# Patient Record
Sex: FEMALE | Race: WHITE | Employment: FULL TIME | URBAN - METROPOLITAN AREA
[De-identification: names, ages, dates, MRNs, and addresses within clinical notes are randomized per-mention and may not be internally consistent; named-entity substitution may affect disease eponyms.]

---

## 2017-01-16 ENCOUNTER — OFFICE VISIT (OUTPATIENT)
Dept: FAMILY MEDICINE CLINIC | Facility: CLINIC | Age: 32
End: 2017-01-16

## 2017-01-16 VITALS
WEIGHT: 168 LBS | TEMPERATURE: 100 F | DIASTOLIC BLOOD PRESSURE: 62 MMHG | BODY MASS INDEX: 26.68 KG/M2 | OXYGEN SATURATION: 98 % | HEART RATE: 88 BPM | HEIGHT: 66.5 IN | SYSTOLIC BLOOD PRESSURE: 112 MMHG

## 2017-01-16 DIAGNOSIS — J06.9 VIRAL URI: ICD-10-CM

## 2017-01-16 DIAGNOSIS — J11.1 FLU SYNDROME: Primary | ICD-10-CM

## 2017-01-16 PROCEDURE — 99213 OFFICE O/P EST LOW 20 MIN: CPT | Performed by: FAMILY MEDICINE

## 2017-01-16 NOTE — PROGRESS NOTES
Manohar Morgan is a 36year old female. Patient presents with:  Cough: per pt   Fatigue  Sore Throat    HPI:   Kiki Farley presents to the office with complaints of upper respiratory tract infection, having congestion for 3 days.   She has had a cough and n disorder   • Other[other] [OTHER] Maternal Grandmother      thyroid disorder   • Cancer Maternal Grandmother 76     breast-treated   • Cancer Maternal Grandfather      skin   • other[other] [OTHER] Father      diverticulitis   • Psychiatric Paternal Grandm indicates understanding of these issues and agrees to the plan. The patient is asked to return if symptoms persist or worsen.       [id number]

## 2017-03-16 ENCOUNTER — TELEPHONE (OUTPATIENT)
Dept: FAMILY MEDICINE CLINIC | Facility: CLINIC | Age: 32
End: 2017-03-16
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
## 2017-03-21 ENCOUNTER — MED REC SCAN ONLY (OUTPATIENT)
Dept: FAMILY MEDICINE CLINIC | Facility: CLINIC | Age: 32
End: 2017-03-21

## 2017-03-24 NOTE — PATIENT INSTRUCTIONS
Viky Chowdhury [phone]  [facility] [phone]  [id number] Yuridia [phone]

## 2017-03-24 NOTE — PROGRESS NOTES
Porter Moncada is a 36year old female. HPI:   Pt is here for ER f/u.     ER: [facility]  Date: 3/19/17  Reason for ER visit: panic attacks and nausea (worsened after starting citalopram, prescribed by my partner when I was out of the office)  Records Take  by mouth.  Disp:  Rfl:         HISTORY:  Past Medical History   Diagnosis Date   • Radicular pain of lower extremity    • Dislocated hip ([address]    • Herniated lumbar disc without myelopathy      pt had mri done 11-15-12   • HSV-1 infection           Pa the ativan and encouraged f/u with counselor; we'll call in a week to get an update; wished her well on her move out of state (recently decided with her boyfriend to move [place])         The patient indicates understanding of these issues and agrees to the

## 2017-03-30 ENCOUNTER — TELEPHONE (OUTPATIENT)
Dept: FAMILY MEDICINE CLINIC | Facility: CLINIC | Age: 32
End: 2017-03-30

## 2017-03-30 NOTE — TELEPHONE ENCOUNTER
Spoke to pt, she states since seeing Dr. Tierney Mantle she has not taken any Ativan. She feels she has had some anxiety but has been able to work through it. With her work schedule being so busy she has not had time to call to schedule with a counselor.      Forward

## 2017-03-30 NOTE — TELEPHONE ENCOUNTER
----- Message from Cari Blanton MD sent at 3/24/2017 11:06 AM CDT -----  Regarding: med f/u  I saw pt one week ago for f/u on her anxiety; we were going to try decreasing the ativan from twice a day to just once a day, taken 30-60min before leaving work (

## 2017-03-31 NOTE — TELEPHONE ENCOUNTER
Spoke with the pt and advised that Dr. Jenna Upton is glad that she is doing better and if she needs anything to please let us know- she v/u

## 2017-03-31 NOTE — TELEPHONE ENCOUNTER
I'm so glad to hear that she's doing a bit better. Please let me know if I can be of any further assistance.

## 2017-04-06 ENCOUNTER — OFFICE VISIT (OUTPATIENT)
Dept: FAMILY MEDICINE CLINIC | Facility: CLINIC | Age: 32
End: 2017-04-06

## 2017-04-06 VITALS
HEIGHT: 66 IN | TEMPERATURE: 98 F | RESPIRATION RATE: 16 BRPM | BODY MASS INDEX: 24.11 KG/M2 | DIASTOLIC BLOOD PRESSURE: 70 MMHG | HEART RATE: 64 BPM | SYSTOLIC BLOOD PRESSURE: 110 MMHG | WEIGHT: 150 LBS

## 2017-04-06 DIAGNOSIS — R76.8 THYROID ANTIBODY POSITIVE: ICD-10-CM

## 2017-04-06 DIAGNOSIS — Z12.4 CERVICAL CANCER SCREENING: ICD-10-CM

## 2017-04-06 DIAGNOSIS — Z83.49 FAMILY HISTORY OF THYROID DISEASE: ICD-10-CM

## 2017-04-06 DIAGNOSIS — Z12.39 SCREENING BREAST EXAMINATION: ICD-10-CM

## 2017-04-06 DIAGNOSIS — Z00.00 ROUTINE HISTORY AND PHYSICAL EXAMINATION OF ADULT: Primary | ICD-10-CM

## 2017-04-06 PROCEDURE — 88175 CYTOPATH C/V AUTO FLUID REDO: CPT | Performed by: FAMILY MEDICINE

## 2017-04-06 PROCEDURE — 99395 PREV VISIT EST AGE 18-39: CPT | Performed by: FAMILY MEDICINE

## 2017-04-06 NOTE — PROGRESS NOTES
HPI:   Corine Bruno is a 36year old female who presents for a complete physical exam.  Patient complains of nothing new, feeling well since our last visit, has needed the anxiety medication once. Things are definitely calming down and more stable. infection           Past Surgical History    EXCIS BARTHOLIN GLAND/CYST      Comment 2006 -2008     CHOLECYSTECTOMY  2012    LEEP      Comment years ago--PAPs normalized after      Family History   Problem Relation Age of Onset   • Other[other] [OTHER] Mot 66\"  Wt 150 lb  BMI 24.22 kg/m2  LMP 03/13/2017 (Exact Date)  Body mass index is 24.22 kg/(m^2).    GENERAL: well developed, well nourished,in no apparent distress  SKIN: no rashes,no suspicious lesions  HEENT: atraumatic, normocephalic,ears and throat are Visit:  No prescriptions requested or ordered in this encounter    Imaging & Consults:  None

## 2017-04-11 ENCOUNTER — TELEPHONE (OUTPATIENT)
Dept: FAMILY MEDICINE CLINIC | Facility: CLINIC | Age: 32
End: 2017-04-11

## 2017-04-11 NOTE — TELEPHONE ENCOUNTER
Please send patient a [employer] message in regards to prepping for procedure/appt  Future Appointments  Date Time Provider Edenilson Judge   5/1/2017 8:20 AM Savage Roblero MD [facility] SAL [place]

## 2017-05-01 ENCOUNTER — OFFICE VISIT (OUTPATIENT)
Dept: FAMILY MEDICINE CLINIC | Facility: CLINIC | Age: 32
End: 2017-05-01

## 2017-05-01 VITALS
HEART RATE: 88 BPM | TEMPERATURE: 98 F | SYSTOLIC BLOOD PRESSURE: 122 MMHG | BODY MASS INDEX: 24 KG/M2 | WEIGHT: 148 LBS | DIASTOLIC BLOOD PRESSURE: 76 MMHG

## 2017-05-01 DIAGNOSIS — R87.612 PAPANICOLAOU SMEAR OF CERVIX WITH LOW GRADE SQUAMOUS INTRAEPITHELIAL LESION (LGSIL): Primary | ICD-10-CM

## 2017-05-01 DIAGNOSIS — Z01.812 PRE-PROCEDURE LAB EXAM: ICD-10-CM

## 2017-05-01 PROCEDURE — 57456 ENDOCERV CURETTAGE W/SCOPE: CPT | Performed by: FAMILY MEDICINE

## 2017-05-01 PROCEDURE — 88305 TISSUE EXAM BY PATHOLOGIST: CPT | Performed by: FAMILY MEDICINE

## 2017-05-01 NOTE — PROGRESS NOTES
Cocnha Faria is a 36year old female. HPI:   Pt is here for a colposcopy.      Indication: LGSIL PAP  Prior colposcopy: yes (age 25 had LEEP)  Pregnancy test: neg  Iodine allergy: no  Vinegar allergy: no    Pt has no concerns prior to this colposcopy 88  Temp(Src) 98 °F (36.7 °C) (Temporal)  Wt 148 lb  LMP 04/10/2017 (Exact Date)  GENERAL: well developed, well nourished,in no apparent distress  : see scanned colpo form; perineum normal; ECC performed; cervical biopsy not performed; monsel's solution

## 2017-05-05 ENCOUNTER — TELEPHONE (OUTPATIENT)
Dept: FAMILY MEDICINE CLINIC | Facility: CLINIC | Age: 32
End: 2017-05-05

## 2017-05-05 NOTE — TELEPHONE ENCOUNTER
----- Message from Aaron Bradley MD sent at 5/5/2017  7:56 AM CDT -----  Please notify pt of good news, her cervical scrapings were negative for abnormality. We can recheck her PAP in 1 year. Please let me know if any questions.

## 2017-06-05 ENCOUNTER — TELEPHONE (OUTPATIENT)
Dept: FAMILY MEDICINE CLINIC | Facility: CLINIC | Age: 32
End: 2017-06-05

## 2017-06-05 NOTE — TELEPHONE ENCOUNTER
Called the pt and advised of the instructions from Dr. Rl Barr and we tentatively scheduled for Wed  Future Appointments  Date Time Provider Edenilson Judge   6/7/2017 2:00 PM Nnamdi Parsons MD [facility] SAL [place]

## 2017-06-30 RX ORDER — ETONOGESTREL/ETHINYL ESTRADIOL .12-.015MG
RING, VAGINAL VAGINAL
Qty: 1 EACH | Refills: 0 | Status: SHIPPED | OUTPATIENT
Start: 2017-06-30 | End: 2017-07-28

## 2017-07-28 RX ORDER — ETONOGESTREL AND ETHINYL ESTRADIOL 11.7; 2.7 MG/1; MG/1
INSERT, EXTENDED RELEASE VAGINAL
Qty: 3 EACH | Refills: 1 | Status: SHIPPED | OUTPATIENT
Start: 2017-07-28 | End: 2018-01-29

## 2018-01-05 ENCOUNTER — OFFICE VISIT (OUTPATIENT)
Dept: FAMILY MEDICINE CLINIC | Facility: CLINIC | Age: 33
End: 2018-01-05

## 2018-01-05 VITALS
BODY MASS INDEX: 25 KG/M2 | RESPIRATION RATE: 16 BRPM | WEIGHT: 157 LBS | SYSTOLIC BLOOD PRESSURE: 104 MMHG | TEMPERATURE: 100 F | DIASTOLIC BLOOD PRESSURE: 60 MMHG | HEART RATE: 72 BPM

## 2018-01-05 DIAGNOSIS — L70.0 CYSTIC ACNE: Primary | ICD-10-CM

## 2018-01-05 PROCEDURE — 82627 DEHYDROEPIANDROSTERONE: CPT | Performed by: FAMILY MEDICINE

## 2018-01-05 PROCEDURE — 84403 ASSAY OF TOTAL TESTOSTERONE: CPT | Performed by: FAMILY MEDICINE

## 2018-01-05 PROCEDURE — 80050 GENERAL HEALTH PANEL: CPT | Performed by: FAMILY MEDICINE

## 2018-01-05 PROCEDURE — 99213 OFFICE O/P EST LOW 20 MIN: CPT | Performed by: FAMILY MEDICINE

## 2018-01-05 PROCEDURE — 84402 ASSAY OF FREE TESTOSTERONE: CPT | Performed by: FAMILY MEDICINE

## 2018-01-05 PROCEDURE — 84439 ASSAY OF FREE THYROXINE: CPT | Performed by: FAMILY MEDICINE

## 2018-01-05 PROCEDURE — 36415 COLL VENOUS BLD VENIPUNCTURE: CPT | Performed by: FAMILY MEDICINE

## 2018-01-05 RX ORDER — GREEN TEA/HOODIA GORDONII 315-12.5MG
1 CAPSULE ORAL
Refills: 0 | COMMUNITY
Start: 2018-01-05 | End: 2018-06-13

## 2018-01-05 NOTE — PROGRESS NOTES
Vielka Jett is a 29year old female. HPI:   Pt is concerned about possible cystic acne on her face, usually on chin, near nose, forehead, sometimes closer to hairline.   Has been a problem 4-5 months now, coming 1 at a time typically every week or thyroid disorder   • Other[other] [OTHER] Maternal Grandmother      thyroid disorder   • Cancer Maternal Grandmother 76     breast-treated   • Cancer Maternal Grandfather      skin   • other[other] [OTHER] Father      diverticulitis   • Psychiatric Milton Persaud

## 2018-01-06 LAB
ALBUMIN SERPL-MCNC: 3.6 G/DL (ref 3.5–4.8)
ALP LIVER SERPL-CCNC: 64 U/L (ref 37–98)
ALT SERPL-CCNC: 21 U/L (ref 14–54)
AST SERPL-CCNC: 16 U/L (ref 15–41)
BASOPHILS # BLD AUTO: 0.05 X10(3) UL (ref 0–0.1)
BASOPHILS NFR BLD AUTO: 0.9 %
BILIRUB SERPL-MCNC: 0.2 MG/DL (ref 0.1–2)
BUN BLD-MCNC: 11 MG/DL (ref 8–20)
CALCIUM BLD-MCNC: 8.7 MG/DL (ref 8.3–10.3)
CHLORIDE: 105 MMOL/L (ref 101–111)
CO2: 27 MMOL/L (ref 22–32)
CREAT BLD-MCNC: 0.62 MG/DL (ref 0.55–1.02)
EOSINOPHIL # BLD AUTO: 0.09 X10(3) UL (ref 0–0.3)
EOSINOPHIL NFR BLD AUTO: 1.6 %
ERYTHROCYTE [DISTWIDTH] IN BLOOD BY AUTOMATED COUNT: 12.8 % (ref 11.5–16)
FREE T4: 0.9 NG/DL (ref 0.9–1.8)
GLUCOSE BLD-MCNC: 92 MG/DL (ref 70–99)
HCT VFR BLD AUTO: 37.6 % (ref 34–50)
HGB BLD-MCNC: 12.3 G/DL (ref 12–16)
IMMATURE GRANULOCYTE COUNT: 0.03 X10(3) UL (ref 0–1)
IMMATURE GRANULOCYTE RATIO %: 0.5 %
LYMPHOCYTES # BLD AUTO: 2.16 X10(3) UL (ref 0.9–4)
LYMPHOCYTES NFR BLD AUTO: 38.5 %
M PROTEIN MFR SERPL ELPH: 7.2 G/DL (ref 6.1–8.3)
MCH RBC QN AUTO: 28 PG (ref 27–33.2)
MCHC RBC AUTO-ENTMCNC: 32.7 G/DL (ref 31–37)
MCV RBC AUTO: 85.5 FL (ref 81–100)
MONOCYTES # BLD AUTO: 0.42 X10(3) UL (ref 0.1–0.6)
MONOCYTES NFR BLD AUTO: 7.5 %
NEUTROPHIL ABS PRELIM: 2.86 X10 (3) UL (ref 1.3–6.7)
NEUTROPHILS # BLD AUTO: 2.86 X10(3) UL (ref 1.3–6.7)
NEUTROPHILS NFR BLD AUTO: 51 %
PLATELET # BLD AUTO: 206 10(3)UL (ref 150–450)
POTASSIUM SERPL-SCNC: 3.8 MMOL/L (ref 3.6–5.1)
RBC # BLD AUTO: 4.4 X10(6)UL (ref 3.8–5.1)
RED CELL DISTRIBUTION WIDTH-SD: 39.8 FL (ref 35.1–46.3)
SODIUM SERPL-SCNC: 141 MMOL/L (ref 136–144)
TSI SER-ACNC: 1.19 MIU/ML (ref 0.35–5.5)
WBC # BLD AUTO: 5.6 X10(3) UL (ref 4–13)

## 2018-01-07 LAB — DHEA SULFATE, SERUM: 253 UG/DL

## 2018-01-08 LAB
SEX HORMONE BINDING GLOBULIN: 208 NMOL/L
TESTOSTERONE -MS, BIOAVAILAB: 5.8 NG/DL
TESTOSTERONE, -MS/MS: 51 NG/DL
TESTOSTERONE, FREE -MS/MS: 2.2 PG/ML

## 2018-01-29 RX ORDER — ETONOGESTREL AND ETHINYL ESTRADIOL 11.7; 2.7 MG/1; MG/1
INSERT, EXTENDED RELEASE VAGINAL
Qty: 3 EACH | Refills: 1 | Status: SHIPPED | OUTPATIENT
Start: 2018-01-29 | End: 2019-03-04

## 2018-01-29 NOTE — TELEPHONE ENCOUNTER
LOV  01/05/2018  Last refill  NuvaRing  07/28/2017 #3 each w. 1 RF  Future Appointments  Date Time Provider Edenilson Judge   4/13/2018 9:20 AM Philip Slade MD [facility] [place]     Please advise.

## 2018-05-07 RX ORDER — LORAZEPAM 1 MG/1
TABLET ORAL
Qty: 15 TABLET | Refills: 0 | Status: SHIPPED
Start: 2018-05-07 | End: 2018-06-13 | Stop reason: DRUGHIGH

## 2018-05-07 NOTE — TELEPHONE ENCOUNTER
Last refilled on 3/24/17 for # 30 with 0 refills  Last seen on 1/5/18  Future Appointments  Date Time Provider Edenilson Judge   6/13/2018 8:45 AM Kanu Contreras MD [facility] SAL [place]        Thank you.

## 2018-06-13 ENCOUNTER — OFFICE VISIT (OUTPATIENT)
Dept: FAMILY MEDICINE CLINIC | Facility: CLINIC | Age: 33
End: 2018-06-13

## 2018-06-13 VITALS
DIASTOLIC BLOOD PRESSURE: 70 MMHG | WEIGHT: 156 LBS | SYSTOLIC BLOOD PRESSURE: 100 MMHG | HEIGHT: 66 IN | TEMPERATURE: 99 F | HEART RATE: 68 BPM | BODY MASS INDEX: 25.07 KG/M2 | RESPIRATION RATE: 16 BRPM

## 2018-06-13 DIAGNOSIS — F41.8 ANXIETY ASSOCIATED WITH DEPRESSION: ICD-10-CM

## 2018-06-13 DIAGNOSIS — Z00.00 ROUTINE HISTORY AND PHYSICAL EXAMINATION OF ADULT: ICD-10-CM

## 2018-06-13 DIAGNOSIS — Z12.4 CERVICAL CANCER SCREENING: Primary | ICD-10-CM

## 2018-06-13 DIAGNOSIS — R21 SKIN RASH: ICD-10-CM

## 2018-06-13 PROCEDURE — 87624 HPV HI-RISK TYP POOLED RSLT: CPT | Performed by: FAMILY MEDICINE

## 2018-06-13 PROCEDURE — 88175 CYTOPATH C/V AUTO FLUID REDO: CPT | Performed by: FAMILY MEDICINE

## 2018-06-13 PROCEDURE — 99395 PREV VISIT EST AGE 18-39: CPT | Performed by: FAMILY MEDICINE

## 2018-06-13 NOTE — PROGRESS NOTES
HPI:   Lluvia Chavarria is a 29year old female who presents for a complete physical exam.  Patient complains of itchy rash on abd coming and going (mostly bra line and waist line) for several days since staying at [facility].  No obv ----------  AST (U/L)   Date Value   01/05/2018 16   02/10/2016 19   06/26/2015 12 (L)   02/15/2013 16   11/20/2012 19   10/31/2012 20   ----------  ALT (U/L)   Date Value   02/15/2013 17   11/20/2012 30   10/31/2012 28   ----------  Alt (U/L)   Date Susan Exercise: yes  Diet: pretty healthy     REVIEW OF SYSTEMS:   GENERAL: feels well in general, no unexpected weight changes, no fevers  SKIN: denies any unusual skin lesions  EYES:denies blurred vision or double vision  HEENT: denies nasal congestion, sinu in a funk including options (cbt/Coaching/normalizaing mood, yoga, meditation, therapy, meds, psych referral); opted to hold off on meds for now, work on the nonmedicinal approaches first  For disease prevention (dementia, cancer, diabetes, heart disease,

## 2018-06-13 NOTE — PATIENT INSTRUCTIONS
Books:     Anything by Mars Prado    The Feeling Good Handbook   By Craig Magdaleno    The Fifth Agreement  By mahesh Interiano    Inspirational/self help  You Are a Bad Ass  By Crescencio Camejo    Haven't ready any yet, but keep getting rave reviews for Emilee Phillips

## 2018-06-24 PROBLEM — F41.8 ANXIETY ASSOCIATED WITH DEPRESSION: Status: ACTIVE | Noted: 2018-06-24

## 2018-09-20 ENCOUNTER — OFFICE VISIT (OUTPATIENT)
Dept: FAMILY MEDICINE CLINIC | Facility: CLINIC | Age: 33
End: 2018-09-20
Payer: COMMERCIAL

## 2018-09-20 VITALS
DIASTOLIC BLOOD PRESSURE: 72 MMHG | SYSTOLIC BLOOD PRESSURE: 122 MMHG | TEMPERATURE: 99 F | WEIGHT: 157 LBS | HEIGHT: 66.25 IN | BODY MASS INDEX: 25.23 KG/M2 | HEART RATE: 72 BPM | RESPIRATION RATE: 14 BRPM

## 2018-09-20 DIAGNOSIS — L98.9 SKIN LESION: Primary | ICD-10-CM

## 2018-09-20 PROCEDURE — 99213 OFFICE O/P EST LOW 20 MIN: CPT | Performed by: FAMILY MEDICINE

## 2018-09-20 RX ORDER — CLINDAMYCIN HYDROCHLORIDE 300 MG/1
300 CAPSULE ORAL 3 TIMES DAILY
Qty: 30 CAPSULE | Refills: 0 | Status: SHIPPED | OUTPATIENT
Start: 2018-09-20 | End: 2018-09-30

## 2018-09-20 RX ORDER — CHOLECALCIFEROL (VITAMIN D3) 50 MCG
TABLET ORAL
COMMUNITY
End: 2019-04-05

## 2018-09-20 NOTE — PROGRESS NOTES
HPI:    Patient ID: Haroon Colin is a 34year old female. Patient presents with:  Bump: per pt, on vaginal area      HPI  Patient is here for a bump in her groin area. On her Rt outer vaginal lip. States she noticed this about 2-3 weeks ago.  It ha date: Dislocated hip ([address]  No date: Herniated lumbar disc without myelopathy      Comment:  pt had mri done 11-15-12  No date: HSV-1 infection  No date: Radicular pain of lower extremity   Past Surgical History:  2012: CHOLECYSTECTOMY  No date: EXCIS PATRICE Mat Chavez MD      The above note was dictated. Any errors in text might be due to dictation.

## 2018-10-17 ENCOUNTER — TELEPHONE (OUTPATIENT)
Dept: FAMILY MEDICINE CLINIC | Facility: CLINIC | Age: 33
End: 2018-10-17

## 2018-10-17 NOTE — TELEPHONE ENCOUNTER
If no fever, not red, not hot and it popped it's likely taking care of itself, can do warm compresses and watch it for the next several days, but if any signs of worsening symptoms/infection jefferson luna

## 2018-10-17 NOTE — TELEPHONE ENCOUNTER
Pt  Called back and nothing has come out and it was almost gone but she felt a hard ball of something there and she completed the abx.   She thought that it was an ingrown hair and she could still feel it there, but it was not getting bigger the over the la

## 2018-10-17 NOTE — TELEPHONE ENCOUNTER
Spoke with the pt and advised of the instructions for the abscess and the pt states that she has another question.     She tells me that her work is trying to make her get a flu shot and she is wanting to know if she can get a note from Dr. Irene Charles to exempt h

## 2018-10-17 NOTE — TELEPHONE ENCOUNTER
Let message for the pt to call back and that I need to know if the abcess got better and then got worse again, it there is any fluid coming out of it

## 2018-10-17 NOTE — TELEPHONE ENCOUNTER
Pt called, was seen approx 3 weeks ago by another dr in our practice for a bump in groin area, prescribed medication, helped a little bit but pt still has the bump. Pt would like to discuss and possible by seen by Dr. Rl Barr herself.   Please call pt at [phone]-

## 2018-10-18 NOTE — TELEPHONE ENCOUNTER
Left detailed message advising Dr Artur Harris note. Ok per [employer] form. Advised to call office with any questions.

## 2019-01-09 ENCOUNTER — OFFICE VISIT (OUTPATIENT)
Dept: FAMILY MEDICINE CLINIC | Facility: CLINIC | Age: 34
End: 2019-01-09
Payer: COMMERCIAL

## 2019-01-09 VITALS
SYSTOLIC BLOOD PRESSURE: 110 MMHG | WEIGHT: 157 LBS | TEMPERATURE: 99 F | DIASTOLIC BLOOD PRESSURE: 70 MMHG | BODY MASS INDEX: 25.23 KG/M2 | HEIGHT: 66.25 IN | RESPIRATION RATE: 12 BRPM | HEART RATE: 70 BPM

## 2019-01-09 DIAGNOSIS — H92.01 RIGHT EAR PAIN: Primary | ICD-10-CM

## 2019-01-09 DIAGNOSIS — H73.891 RETRACTION OF TYMPANIC MEMBRANE OF RIGHT EAR: ICD-10-CM

## 2019-01-09 DIAGNOSIS — F43.22 ADJUSTMENT DISORDER WITH ANXIETY: ICD-10-CM

## 2019-01-09 PROCEDURE — 99213 OFFICE O/P EST LOW 20 MIN: CPT | Performed by: FAMILY MEDICINE

## 2019-01-09 RX ORDER — LORAZEPAM 0.5 MG/1
TABLET ORAL
Qty: 30 TABLET | Refills: 0 | Status: SHIPPED
Start: 2019-01-09 | End: 2019-03-01

## 2019-01-09 RX ORDER — PREDNISONE 20 MG/1
TABLET ORAL
Qty: 11 TABLET | Refills: 0 | Status: SHIPPED | OUTPATIENT
Start: 2019-01-09 | End: 2019-04-05 | Stop reason: ALTCHOICE

## 2019-01-10 NOTE — PROGRESS NOTES
HPI:   Pee Zambrano is a 34year old female who presents for upper respiratory symptoms for at least 14 days. Started with: had a rough URI after thanksgiving, was seen in UC had neg flu/strep/mono testing and the worst of that resolved.     Now ha Tobacco Use      Smoking status: Former Smoker        Packs/day: 0.10        Years: 8.00        Pack years: .8        Types: Cigarettes      Smokeless tobacco: Never Used    Alcohol use:  Yes      Alcohol/week: 0.0 oz      Comment: occasionally     Drug us

## 2019-03-01 NOTE — TELEPHONE ENCOUNTER
Lorazepam last refilled on 1/9/19 for # 30 with 0 refills  nuvaring refilled 1/29/18 #3 with 1 refill  Last OV 1/9/19  Future Appointments   Date Time Provider Edenilson Judge   4/5/2019  9:00 AM Mk Núñez MD [facility] SAL [place]        Thank you.

## 2019-03-01 NOTE — TELEPHONE ENCOUNTER
Etonogestrel-Ethinyl Estradiol (NUVARING) 0.12-0.015 MG/24HR Vaginal Ring    LORazepam (ATIVAN) 0.5 MG Oral Tab  [facility]/PHARMACY #[id number]- [place], [place] - [address] [place] [address], [phone]

## 2019-03-04 RX ORDER — LORAZEPAM 0.5 MG/1
TABLET ORAL
Qty: 30 TABLET | Refills: 0 | Status: SHIPPED
Start: 2019-03-04 | End: 2019-09-23

## 2019-03-04 RX ORDER — ETONOGESTREL AND ETHINYL ESTRADIOL 11.7; 2.7 MG/1; MG/1
INSERT, EXTENDED RELEASE VAGINAL
Qty: 3 EACH | Refills: 1 | Status: SHIPPED | OUTPATIENT
Start: 2019-03-04 | End: 2019-07-26

## 2019-04-05 ENCOUNTER — OFFICE VISIT (OUTPATIENT)
Dept: FAMILY MEDICINE CLINIC | Facility: CLINIC | Age: 34
End: 2019-04-05
Payer: COMMERCIAL

## 2019-04-05 VITALS
BODY MASS INDEX: 26.03 KG/M2 | WEIGHT: 162 LBS | SYSTOLIC BLOOD PRESSURE: 120 MMHG | HEART RATE: 77 BPM | TEMPERATURE: 98 F | RESPIRATION RATE: 14 BRPM | HEIGHT: 66.25 IN | DIASTOLIC BLOOD PRESSURE: 76 MMHG | OXYGEN SATURATION: 98 %

## 2019-04-05 DIAGNOSIS — F41.8 ANXIETY ASSOCIATED WITH DEPRESSION: ICD-10-CM

## 2019-04-05 DIAGNOSIS — Z13.1 DIABETES MELLITUS SCREENING: ICD-10-CM

## 2019-04-05 DIAGNOSIS — Z12.4 CERVICAL CANCER SCREENING: ICD-10-CM

## 2019-04-05 DIAGNOSIS — E55.9 VITAMIN D DEFICIENCY: ICD-10-CM

## 2019-04-05 DIAGNOSIS — R76.8 THYROID ANTIBODY POSITIVE: ICD-10-CM

## 2019-04-05 DIAGNOSIS — Z00.00 ROUTINE HISTORY AND PHYSICAL EXAMINATION OF ADULT: Primary | ICD-10-CM

## 2019-04-05 DIAGNOSIS — B00.9 HSV-1 INFECTION: ICD-10-CM

## 2019-04-05 DIAGNOSIS — Z83.49 FAMILY HISTORY OF THYROID DISEASE: ICD-10-CM

## 2019-04-05 DIAGNOSIS — Z13.0 SCREENING, ANEMIA, DEFICIENCY, IRON: ICD-10-CM

## 2019-04-05 DIAGNOSIS — Z13.29 THYROID DISORDER SCREEN: ICD-10-CM

## 2019-04-05 DIAGNOSIS — Z13.220 LIPID SCREENING: ICD-10-CM

## 2019-04-05 DIAGNOSIS — Z12.39 SCREENING BREAST EXAMINATION: ICD-10-CM

## 2019-04-05 DIAGNOSIS — M54.16 LUMBAR RADICULOPATHY: ICD-10-CM

## 2019-04-05 PROCEDURE — 88175 CYTOPATH C/V AUTO FLUID REDO: CPT | Performed by: FAMILY MEDICINE

## 2019-04-05 PROCEDURE — 85025 COMPLETE CBC W/AUTO DIFF WBC: CPT | Performed by: FAMILY MEDICINE

## 2019-04-05 PROCEDURE — 86800 THYROGLOBULIN ANTIBODY: CPT | Performed by: FAMILY MEDICINE

## 2019-04-05 PROCEDURE — 87624 HPV HI-RISK TYP POOLED RSLT: CPT | Performed by: FAMILY MEDICINE

## 2019-04-05 PROCEDURE — 82306 VITAMIN D 25 HYDROXY: CPT | Performed by: FAMILY MEDICINE

## 2019-04-05 PROCEDURE — 86376 MICROSOMAL ANTIBODY EACH: CPT | Performed by: FAMILY MEDICINE

## 2019-04-05 PROCEDURE — 84443 ASSAY THYROID STIM HORMONE: CPT | Performed by: FAMILY MEDICINE

## 2019-04-05 PROCEDURE — 80053 COMPREHEN METABOLIC PANEL: CPT | Performed by: FAMILY MEDICINE

## 2019-04-05 PROCEDURE — 84439 ASSAY OF FREE THYROXINE: CPT | Performed by: FAMILY MEDICINE

## 2019-04-05 PROCEDURE — 99395 PREV VISIT EST AGE 18-39: CPT | Performed by: FAMILY MEDICINE

## 2019-04-05 PROCEDURE — 36415 COLL VENOUS BLD VENIPUNCTURE: CPT | Performed by: FAMILY MEDICINE

## 2019-04-05 PROCEDURE — 80061 LIPID PANEL: CPT | Performed by: FAMILY MEDICINE

## 2019-04-05 PROCEDURE — 84480 ASSAY TRIIODOTHYRONINE (T3): CPT | Performed by: FAMILY MEDICINE

## 2019-04-05 RX ORDER — ETONOGESTREL AND ETHINYL ESTRADIOL 11.7; 2.7 MG/1; MG/1
1 INSERT, EXTENDED RELEASE VAGINAL
COMMUNITY
End: 2019-07-28

## 2019-04-05 NOTE — PROGRESS NOTES
HPI:   Froylan Guerra is a 34year old female who presents for a complete physical exam.      Patient complains of bi ear fullness still since jan, never got better and now feeling some fullness in her face and headaches this week, pressure bheind eyeb Date   • Dislocated hip ([facility] [address]    • Herniated lumbar disc without myelopathy     pt had mri done 11-15-12   • HSV-1 infection    • Radicular pain of lower extremity       Past Surgical History:   Procedure Laterality Date   • CHOLECYSTECTOMY  2012   • EXCIS 03/13/2019   SpO2 98%   BMI 25.95 kg/m²   Body mass index is 25.95 kg/m².    GENERAL: well developed, well nourished,in no apparent distress  SKIN: no rashes,no suspicious lesions  HEENT: atraumatic, normocephalic,ears and throat are clear  EYES:CAITILN JORGENSEN PEROXIDASE AND THYROGLOBULIN ANTIBODIES  - TRIIODOTHYRONINE (T3) TOTAL  - FREE T4 (FREE THYROXINE)  - CBC W/ DIFFERENTIAL    2. Screening breast examination  Normal exam    3.  Cervical cancer screening  If normal can repeat in 2 years  - THINPREP PAP SMEAR VITAMIN D, 25-HYDROXY    11. Lumbar radiculopathy  Well controlled, no further eval/tx desired at this time    12. HSV-1 infection  Rare symtpoms (asymptomatic now)    13.  Anxiety associated with depression  Doing quite well with non medicinal strategies,

## 2019-04-10 ENCOUNTER — TELEPHONE (OUTPATIENT)
Dept: FAMILY MEDICINE CLINIC | Facility: CLINIC | Age: 34
End: 2019-04-10

## 2019-04-10 DIAGNOSIS — H93.8X3 EAR FULLNESS, BILATERAL: Primary | ICD-10-CM

## 2019-04-10 NOTE — TELEPHONE ENCOUNTER
Dr. Gaby Vergara, ENT , Ji Jackson is requesting referral from [facility] for pt to be seen.  Pls fax to [address] [place] [address] West: [phone]

## 2019-05-01 ENCOUNTER — MED REC SCAN ONLY (OUTPATIENT)
Dept: FAMILY MEDICINE CLINIC | Facility: CLINIC | Age: 34
End: 2019-05-01

## 2019-05-03 ENCOUNTER — E-VISIT (OUTPATIENT)
Dept: FAMILY MEDICINE CLINIC | Facility: CLINIC | Age: 34
End: 2019-05-03

## 2019-05-03 DIAGNOSIS — R30.0 DYSURIA: Primary | ICD-10-CM

## 2019-05-07 ENCOUNTER — TELEPHONE (OUTPATIENT)
Dept: FAMILY MEDICINE CLINIC | Facility: CLINIC | Age: 34
End: 2019-05-07

## 2019-05-07 NOTE — TELEPHONE ENCOUNTER
Patient found an ENT through her insurance for some issues she has been having. They ordered a MRI for her which she did, and she also requested that a copy be sent to Dr. Umu Faith.  Patient has a f/u appointment with a hearing test scheduled with the ENT tomor

## 2019-07-28 RX ORDER — ETONOGESTREL AND ETHINYL ESTRADIOL 11.7; 2.7 MG/1; MG/1
INSERT, EXTENDED RELEASE VAGINAL
Qty: 3 EACH | Refills: 3 | Status: SHIPPED | OUTPATIENT
Start: 2019-07-28 | End: 2020-06-24 | Stop reason: ALTCHOICE

## 2019-07-30 RX ORDER — ETONOGESTREL AND ETHINYL ESTRADIOL 11.7; 2.7 MG/1; MG/1
INSERT, EXTENDED RELEASE VAGINAL
Qty: 3 EACH | Refills: 3 | Status: SHIPPED | OUTPATIENT
Start: 2019-07-30 | End: 2020-08-25

## 2019-09-24 RX ORDER — LORAZEPAM 0.5 MG/1
TABLET ORAL
Qty: 30 TABLET | Refills: 0 | Status: SHIPPED | OUTPATIENT
Start: 2019-09-24 | End: 2020-03-09

## 2019-09-30 ENCOUNTER — E-VISIT (OUTPATIENT)
Dept: FAMILY MEDICINE CLINIC | Facility: CLINIC | Age: 34
End: 2019-09-30

## 2019-09-30 DIAGNOSIS — J01.90 ACUTE SINUSITIS, RECURRENCE NOT SPECIFIED, UNSPECIFIED LOCATION: Primary | ICD-10-CM

## 2019-09-30 PROCEDURE — 98969 ONLINE SERVICE BY HC PRO: CPT | Performed by: NURSE PRACTITIONER

## 2019-09-30 RX ORDER — AMOXICILLIN AND CLAVULANATE POTASSIUM 875; 125 MG/1; MG/1
1 TABLET, FILM COATED ORAL 2 TIMES DAILY
Qty: 20 TABLET | Refills: 0 | Status: SHIPPED | OUTPATIENT
Start: 2019-09-30 | End: 2019-10-10

## 2019-09-30 RX ORDER — FLUTICASONE PROPIONATE 50 MCG
2 SPRAY, SUSPENSION (ML) NASAL DAILY
Qty: 1 INHALER | Refills: 0 | Status: SHIPPED | OUTPATIENT
Start: 2019-09-30 | End: 2020-05-01 | Stop reason: ALTCHOICE

## 2019-10-04 ENCOUNTER — TELEPHONE (OUTPATIENT)
Dept: FAMILY MEDICINE CLINIC | Facility: CLINIC | Age: 34
End: 2019-10-04

## 2019-10-04 NOTE — TELEPHONE ENCOUNTER
Pt is calling to let us know now she is having a lot of sinus pressure and sore throat. She was emailing back and forth with [address] and did an Evisit 9-30-19. She has been taking OTC sinus pressure meds, Ibuprofen, ABX and sinus spray.    She is looking for rebel

## 2019-10-07 ENCOUNTER — TELEPHONE (OUTPATIENT)
Dept: FAMILY MEDICINE CLINIC | Facility: CLINIC | Age: 34
End: 2019-10-07

## 2019-10-07 DIAGNOSIS — B37.3 VAGINAL CANDIDA: Primary | ICD-10-CM

## 2019-10-07 RX ORDER — FLUCONAZOLE 150 MG/1
TABLET ORAL
Qty: 2 TABLET | Refills: 0 | Status: SHIPPED | OUTPATIENT
Start: 2019-10-07 | End: 2020-05-01 | Stop reason: ALTCHOICE

## 2019-10-07 NOTE — TELEPHONE ENCOUNTER
PT is going to see Dr. Mateo Soriano on Wednesday for the sinus issue. Forward to Dr. Humera Peña to send in medication for yeast infection. Thanks.     Future Appointments   Date Time Provider Edenilson Judge   10/9/2019  3:30 PM Amanda Dent DO [facility]

## 2019-10-07 NOTE — TELEPHONE ENCOUNTER
Pt was put on an antibiotic (which isn't helping) and she now has a yeast infection.  Please call back

## 2019-10-07 NOTE — TELEPHONE ENCOUNTER
I can send a script for the yeast infection, but for the respiratory issues, I would like her to be seen since she hasn't actually been seen in person for this. Let me know if she would like th diflucan script.

## 2019-10-07 NOTE — TELEPHONE ENCOUNTER
Forward to Dr. Darío Cartagena, please advise, there are messages via [employer]. Does she need to be seen?

## 2019-10-09 ENCOUNTER — OFFICE VISIT (OUTPATIENT)
Dept: FAMILY MEDICINE CLINIC | Facility: CLINIC | Age: 34
End: 2019-10-09
Payer: COMMERCIAL

## 2019-10-09 VITALS
WEIGHT: 143.81 LBS | HEART RATE: 86 BPM | HEIGHT: 66.25 IN | DIASTOLIC BLOOD PRESSURE: 80 MMHG | RESPIRATION RATE: 10 BRPM | TEMPERATURE: 100 F | BODY MASS INDEX: 23.11 KG/M2 | SYSTOLIC BLOOD PRESSURE: 110 MMHG

## 2019-10-09 DIAGNOSIS — R19.8 CLENCHING OF TEETH: ICD-10-CM

## 2019-10-09 DIAGNOSIS — R09.81 SINUS CONGESTION: Primary | ICD-10-CM

## 2019-10-09 DIAGNOSIS — H92.02 LEFT EAR PAIN: ICD-10-CM

## 2019-10-09 DIAGNOSIS — G44.209 TENSION HEADACHE: ICD-10-CM

## 2019-10-09 DIAGNOSIS — R51.9 HEADACHE BEHIND THE EYES: ICD-10-CM

## 2019-10-09 DIAGNOSIS — M26.622 ARTHRALGIA OF LEFT TEMPOROMANDIBULAR JOINT: ICD-10-CM

## 2019-10-09 PROCEDURE — 99214 OFFICE O/P EST MOD 30 MIN: CPT | Performed by: FAMILY MEDICINE

## 2019-10-09 RX ORDER — CYCLOBENZAPRINE HCL 10 MG
10 TABLET ORAL NIGHTLY
Qty: 10 TABLET | Refills: 0 | Status: SHIPPED | OUTPATIENT
Start: 2019-10-09 | End: 2020-08-25

## 2019-10-09 NOTE — PROGRESS NOTES
HPI:   Wisam Valle is a 30year old female who presents for upper respiratory symptoms for  10  days. Patient reports sore throat, congestion, clear colored nasal discharge, cough with white colored sputum, sinus pain.       Current Outpatient [facility] possibly bipolar   • Psychiatric Other         anxiety/depression      Social History    Tobacco Use      Smoking status: Former Smoker        Packs/day: 0.10        Years: 8.00        Pack years: .8        Types: Cigarettes      Smokeless tobacco: Never U

## 2019-11-07 NOTE — TELEPHONE ENCOUNTER
unlikely for anything gyne to cause a fever unless there's been a missed or partial miscarriage.   Do pregnancy test, if positive needs to go to ER for eval. If negative she can see how the next few days go and if not resolving should be seen (perhaps get a 18

## 2020-01-31 ENCOUNTER — MED REC SCAN ONLY (OUTPATIENT)
Dept: FAMILY MEDICINE CLINIC | Facility: CLINIC | Age: 35
End: 2020-01-31

## 2020-02-03 ENCOUNTER — MED REC SCAN ONLY (OUTPATIENT)
Dept: FAMILY MEDICINE CLINIC | Facility: CLINIC | Age: 35
End: 2020-02-03

## 2020-03-09 RX ORDER — LORAZEPAM 0.5 MG/1
TABLET ORAL
Qty: 30 TABLET | Refills: 0 | Status: SHIPPED | OUTPATIENT
Start: 2020-03-09 | End: 2020-08-25

## 2020-03-09 NOTE — TELEPHONE ENCOUNTER
Routing to provider per protocol. Last refilled on 9/24/19 for # 30 with 0 rf. Last seen on 10/9/19. Future Appointments   Date Time Provider Edenilson Judge   4/8/2020  5:00 PM Elizabeth Ennis MD [facility] EM[facility] [place]        Thank you.

## 2020-03-25 ENCOUNTER — TELEPHONE (OUTPATIENT)
Dept: FAMILY MEDICINE CLINIC | Facility: CLINIC | Age: 35
End: 2020-03-25

## 2020-03-25 NOTE — TELEPHONE ENCOUNTER
[facility] has been [facility] at [facility]. She has been placed on a 7 day quarantine. They gave her a letter stating as much however patient works for the [employer] and they are requiring some additional paperwork to be filled out.  The UC is not willing

## 2020-03-25 NOTE — TELEPHONE ENCOUNTER
Sure. I mean if she can get something from [facility], that she was seen and was negative?   I can;t just fill out the for without seeing her, if she wants to come in [facility] I can, but during these times for me to say she's good without actually seein

## 2020-03-25 NOTE — TELEPHONE ENCOUNTER
Per the covering provider he is not comfortable filling out paperwork for this patient to be off od work wihtout seeing her. Called the pt and advised of the this.  I explained that paperworkis not being filled out and that we are following federal guide

## 2020-03-25 NOTE — TELEPHONE ENCOUNTER
Spoke with the pt and she states that her work needs some CMS  Form for the [address] with her restrictions  They need a fx copy and then the original copy mailed to them    Advised that if she can attach it to her [employer] and send a message with t

## 2020-04-30 ENCOUNTER — TELEPHONE (OUTPATIENT)
Dept: FAMILY MEDICINE CLINIC | Facility: CLINIC | Age: 35
End: 2020-04-30

## 2020-04-30 NOTE — TELEPHONE ENCOUNTER
Left message for patient to call office to schedule video visit    Advised if any facial swelling or severe pain or other concerning symptoms go to ER.

## 2020-04-30 NOTE — TELEPHONE ENCOUNTER
Pt is having left side jaw pain that is moving down her neck. Pt is at work. She is asking that we leave a detailed message. She will not be able to check her phone until after 4. Pt will be free all day tomorrow if KM wants to do a video visit.

## 2020-05-01 ENCOUNTER — TELEPHONE (OUTPATIENT)
Dept: FAMILY MEDICINE CLINIC | Facility: CLINIC | Age: 35
End: 2020-05-01

## 2020-05-01 ENCOUNTER — TELEMEDICINE (OUTPATIENT)
Dept: FAMILY MEDICINE CLINIC | Facility: CLINIC | Age: 35
End: 2020-05-01
Payer: COMMERCIAL

## 2020-05-01 VITALS — BODY MASS INDEX: 24 KG/M2 | WEIGHT: 151 LBS

## 2020-05-01 DIAGNOSIS — M54.2 NECK PAIN ON LEFT SIDE: ICD-10-CM

## 2020-05-01 DIAGNOSIS — R51.9 LEFT-SIDED FACE PAIN: Primary | ICD-10-CM

## 2020-05-01 PROCEDURE — 99214 OFFICE O/P EST MOD 30 MIN: CPT | Performed by: FAMILY MEDICINE

## 2020-05-01 RX ORDER — CYCLOBENZAPRINE HCL 10 MG
10 TABLET ORAL NIGHTLY PRN
Qty: 30 TABLET | Refills: 1 | Status: SHIPPED | OUTPATIENT
Start: 2020-05-01 | End: 2020-05-21

## 2020-05-01 NOTE — TELEPHONE ENCOUNTER
Patient notified via detailed voicemail left at cell number (ok per  HIPAA consent)    Asked to call office back to schedule 30 min in office appointment with Dr Julian John

## 2020-05-01 NOTE — TELEPHONE ENCOUNTER
Corine Bruno verbally consents to a Virtual/Telephone Check-In service on 5/1/20.   Patient understands and accepts financial responsibility for any deductible, co-insurance and/or co-pays associated with this service

## 2020-05-01 NOTE — TELEPHONE ENCOUNTER
Pt called to schedule her appt. She said she does work at [employer]. They have been positive covid inmates and staff. She doesn't believe she has had an contact with anyone of them., She doesn't have any symptoms right now.  She would l

## 2020-05-01 NOTE — PROGRESS NOTES
Virtual Video/Telephone Check-In    Manohar Morgan verbally consents to a Virtual/Telephone Check-In visit on 5/1/2020    Patient understands and accepts financial responsibility for any deductible, co-insurance and/or co-pays associated with this serv Medication Sig Dispense Refill   • cyclobenzaprine 10 MG Oral Tab Take 1 tablet (10 mg total) by mouth nightly as needed for Muscle spasms.  30 tablet 1   • LORazepam 0.5 MG Oral Tab TAKE 1 TABLET BY MOUTH ONCE TO TWICE A DAY AS NEEDED FOR ANXIETY/PANIC 3 HPI    EXAM:   Wt 151 lb (68.5 kg)   BMI 24.19 kg/m²   GENERAL: well developed, well nourished,in no apparent distress  SKIN: no rashes,no suspicious lesions on face or ant neck  HEENT: atraumatic, normocephalic, no dyfunction of jaw noted on opening/closi

## 2020-05-01 NOTE — TELEPHONE ENCOUNTER
Patient notified via detailed voicemail left at cell number (ok per  HIPAA consent)  Asked to call office back to schedule video visit.

## 2020-05-01 NOTE — TELEPHONE ENCOUNTER
You know, we could start with video, and if I stlll need to examine her won't charge for video and will bring in to office

## 2020-05-01 NOTE — TELEPHONE ENCOUNTER
Pain on left side of face travels down the neck. States this is ongoing. Patient states she saw Dr Sugar Lee for the same issue about a year ago. Initially thought it was ear infection but [address] saw nothing and referred to ENT  ENT found nothing.     Saw Dr Brynn James

## 2020-05-01 NOTE — TELEPHONE ENCOUNTER
Future Appointments   Date Time Provider Edenilson Judge   5/1/2020  3:30 PM Bridget Fisher MD [facility] EM[facility] [place] Rodríguez   6/24/2020  5:00 PM [facility]

## 2020-05-13 ENCOUNTER — OFFICE VISIT (OUTPATIENT)
Dept: PHYSICAL THERAPY | Age: 35
End: 2020-05-13
Attending: FAMILY MEDICINE
Payer: COMMERCIAL

## 2020-05-13 DIAGNOSIS — M54.2 NECK PAIN ON LEFT SIDE: ICD-10-CM

## 2020-05-13 DIAGNOSIS — R51.9 LEFT-SIDED FACE PAIN: ICD-10-CM

## 2020-05-13 PROCEDURE — 97162 PT EVAL MOD COMPLEX 30 MIN: CPT

## 2020-05-13 PROCEDURE — 97140 MANUAL THERAPY 1/> REGIONS: CPT

## 2020-05-13 NOTE — PROGRESS NOTES
INITIAL EVALUATION:   Referring Physician: Dr. Earl Henning  Diagnosis:   -Left-sided face pain   -Neck pain on left side    Date of Service: 5/13/2020     PATIENT SUMMARY    Froylan Guerra is a 30year old female   -[place]    Primary Complaint(s)/Concer None      PLAN OF CARE:    Goals:    (6 visits)  -Minimal headache, neck, facial pain during course day evening  -Demonstrate independence with postural exercise program    Frequency / Duration:   -One time per week - six week  -STM  -Impairment based exer

## 2020-05-20 ENCOUNTER — APPOINTMENT (OUTPATIENT)
Dept: PHYSICAL THERAPY | Age: 35
End: 2020-05-20
Attending: FAMILY MEDICINE
Payer: COMMERCIAL

## 2020-05-22 ENCOUNTER — APPOINTMENT (OUTPATIENT)
Dept: PHYSICAL THERAPY | Age: 35
End: 2020-05-22
Attending: FAMILY MEDICINE
Payer: COMMERCIAL

## 2020-05-27 ENCOUNTER — APPOINTMENT (OUTPATIENT)
Dept: PHYSICAL THERAPY | Age: 35
End: 2020-05-27
Attending: FAMILY MEDICINE
Payer: COMMERCIAL

## 2020-05-27 ENCOUNTER — OFFICE VISIT (OUTPATIENT)
Dept: PHYSICAL THERAPY | Age: 35
End: 2020-05-27
Attending: FAMILY MEDICINE
Payer: COMMERCIAL

## 2020-05-27 PROCEDURE — 97140 MANUAL THERAPY 1/> REGIONS: CPT

## 2020-05-27 NOTE — PROGRESS NOTES
Dx:        -Left-sided face pain   -Neck pain on left side      Authorized # of Visits:  No prior authorization is required per appointment notes         Next MD visit: none scheduled  Fall Risk: standard           Precautions: n/a             Subjective:

## 2020-05-28 ENCOUNTER — TELEPHONE (OUTPATIENT)
Dept: PHYSICAL THERAPY | Facility: HOSPITAL | Age: 35
End: 2020-05-28

## 2020-05-29 ENCOUNTER — APPOINTMENT (OUTPATIENT)
Dept: PHYSICAL THERAPY | Age: 35
End: 2020-05-29
Attending: FAMILY MEDICINE
Payer: COMMERCIAL

## 2020-05-29 ENCOUNTER — TELEPHONE (OUTPATIENT)
Dept: PHYSICAL THERAPY | Age: 35
End: 2020-05-29

## 2020-05-29 NOTE — TELEPHONE ENCOUNTER
Inbox message rec'd from department PSR that patient had phoned with update following last visit; phoned patient this date for condition update; had increase in headache Wednesday evening into Thursday, however, with good improvement today to point of bett

## 2020-06-11 ENCOUNTER — OFFICE VISIT (OUTPATIENT)
Dept: PHYSICAL THERAPY | Age: 35
End: 2020-06-11
Attending: FAMILY MEDICINE
Payer: COMMERCIAL

## 2020-06-11 NOTE — PROGRESS NOTES
Dx:        -Left-sided face pain   -Neck pain on left side      Authorized # of Visits:  No prior authorization is required per appointment notes         Next MD visit: none scheduled  Fall Risk: standard           Precautions: n/a             Subjective: -Instruction with return demonstration scapular retraction with ER; B horizontal abduction; D2 flexion unilateal both UE (red resistance band Reviewed    -Reviewed resting jaw position; instruction TMJ rolling                                     Charges:

## 2020-06-23 ENCOUNTER — TELEPHONE (OUTPATIENT)
Dept: PHYSICAL THERAPY | Age: 35
End: 2020-06-23

## 2020-06-24 ENCOUNTER — APPOINTMENT (OUTPATIENT)
Dept: PHYSICAL THERAPY | Age: 35
End: 2020-06-24
Attending: FAMILY MEDICINE
Payer: COMMERCIAL

## 2020-06-24 ENCOUNTER — OFFICE VISIT (OUTPATIENT)
Dept: FAMILY MEDICINE CLINIC | Facility: CLINIC | Age: 35
End: 2020-06-24
Payer: COMMERCIAL

## 2020-06-24 VITALS
TEMPERATURE: 100 F | WEIGHT: 153.81 LBS | HEART RATE: 84 BPM | DIASTOLIC BLOOD PRESSURE: 60 MMHG | SYSTOLIC BLOOD PRESSURE: 92 MMHG | BODY MASS INDEX: 24.72 KG/M2 | HEIGHT: 66 IN

## 2020-06-24 DIAGNOSIS — Z12.39 SCREENING BREAST EXAMINATION: ICD-10-CM

## 2020-06-24 DIAGNOSIS — Z13.1 DIABETES MELLITUS SCREENING: ICD-10-CM

## 2020-06-24 DIAGNOSIS — Z13.220 LIPID SCREENING: ICD-10-CM

## 2020-06-24 DIAGNOSIS — R76.8 THYROID ANTIBODY POSITIVE: ICD-10-CM

## 2020-06-24 DIAGNOSIS — Z83.49 FAMILY HISTORY OF THYROID DISEASE: ICD-10-CM

## 2020-06-24 DIAGNOSIS — Z00.00 ROUTINE HISTORY AND PHYSICAL EXAMINATION OF ADULT: Primary | ICD-10-CM

## 2020-06-24 DIAGNOSIS — E55.9 VITAMIN D DEFICIENCY: ICD-10-CM

## 2020-06-24 DIAGNOSIS — Z13.0 SCREENING, ANEMIA, DEFICIENCY, IRON: ICD-10-CM

## 2020-06-24 PROCEDURE — 99395 PREV VISIT EST AGE 18-39: CPT | Performed by: FAMILY MEDICINE

## 2020-06-24 NOTE — PROGRESS NOTES
HPI:   Sheridan Mcnulty is a 30year old female who presents for a complete physical exam.      Patient complains of nothing major. She did see PHYSICAL THERAPY for a few weeks and hasn't had a HA for 2.5 weeks, they really think it helped.   They did a Etonogestrel-Ethinyl Estradiol (NUVARING) 0.12-0.015 MG/24HR Vaginal Ring INSERT ONE  INTO VAGINA ONCE EVERY MONTH . Oneal Claude Oneal Claude LEAVE  IN  FOR  3  WEEKS  THEN  REMOVE  FOR  1  WEEK  THEN  REPEAT  MONTHLY 3 each 3      Past Medical History:   Diagnosis Date   • Anxie syncope  PSYCHE: denies depression or anxiety  HEMATOLOGIC: no bruising or noted lymph nodes    EXAM:   BP 92/60   Pulse 84   Temp 99.8 °F (37.7 °C) (Temporal)   Ht 66\"   Wt 153 lb 12.8 oz (69.8 kg)   BMI 24.82 kg/m²   Body mass index is 24.82 kg/m².    GE THYROXINE); Future    Vitamin D deficiency  -     VITAMIN D, 25-HYDROXY; Future    Screening, anemia, deficiency, iron  -     CBC WITH DIFFERENTIAL WITH PLATELET; Future    Diabetes mellitus screening  -     COMP METABOLIC PANEL (14);  Future    Lipid scree

## 2020-08-24 NOTE — TELEPHONE ENCOUNTER
Lorazepam last refilled on 3/9/20 for # 30 with 0 refills  nuvaring 7/30/19 #3 3 refill  Last OV 6/24/20  No future appointments. Thank you.

## 2020-08-24 NOTE — TELEPHONE ENCOUNTER
Last refilled on 5/1/20 for # 30 with 1 refills  Last OV 6/24/20  No future appointments. Thank you.

## 2020-08-25 RX ORDER — LORAZEPAM 0.5 MG/1
TABLET ORAL
Qty: 30 TABLET | Refills: 0 | Status: SHIPPED | OUTPATIENT
Start: 2020-08-25 | End: 2021-05-21

## 2020-08-25 RX ORDER — CYCLOBENZAPRINE HCL 10 MG
10 TABLET ORAL NIGHTLY PRN
Qty: 30 TABLET | Refills: 0 | Status: SHIPPED | OUTPATIENT
Start: 2020-08-25 | End: 2020-11-20

## 2020-08-25 RX ORDER — ETONOGESTREL AND ETHINYL ESTRADIOL 11.7; 2.7 MG/1; MG/1
INSERT, EXTENDED RELEASE VAGINAL
Qty: 3 EACH | Refills: 3 | Status: SHIPPED | OUTPATIENT
Start: 2020-08-25 | End: 2021-07-09

## 2020-09-23 NOTE — TELEPHONE ENCOUNTER
Fax from [facility] pharmacy asking for clarification on nuvaring script  No direction on the script sent    Please send new script 92

## 2020-11-20 RX ORDER — CYCLOBENZAPRINE HCL 10 MG
10 TABLET ORAL NIGHTLY PRN
Qty: 30 TABLET | Refills: 0 | Status: SHIPPED | OUTPATIENT
Start: 2020-11-20 | End: 2021-05-21

## 2020-11-20 NOTE — TELEPHONE ENCOUNTER
Protocol: none  Last refilled 8/25/20 #30 with 0 RF  LOV with [address] 6/24/20  No future appt  Routed to PCP to advise refill

## 2021-01-06 ENCOUNTER — E-VISIT (OUTPATIENT)
Dept: TELEHEALTH | Age: 36
End: 2021-01-06

## 2021-01-06 ENCOUNTER — TELEPHONE (OUTPATIENT)
Dept: FAMILY MEDICINE CLINIC | Facility: CLINIC | Age: 36
End: 2021-01-06

## 2021-01-06 DIAGNOSIS — R39.9 SYMPTOMS OF URINARY TRACT INFECTION: Primary | ICD-10-CM

## 2021-01-06 PROCEDURE — 99421 OL DIG E/M SVC 5-10 MIN: CPT | Performed by: NURSE PRACTITIONER

## 2021-01-06 RX ORDER — NITROFURANTOIN 25; 75 MG/1; MG/1
CAPSULE ORAL
Qty: 14 CAPSULE | Refills: 0 | Status: SHIPPED | OUTPATIENT
Start: 2021-01-06 | End: 2021-09-29 | Stop reason: ALTCHOICE

## 2021-01-06 NOTE — PROGRESS NOTES
Froylan Guerra is a 37year old female. HPI:   See answers to questions above.      Current Outpatient Medications   Medication Sig Dispense Refill   • Nitrofurantoin Monohyd Macro 100 MG Oral Cap Take one capsule 2 times daily for 7 days 14 capsule Prescriptions Disp Refills   • Nitrofurantoin Monohyd Macro 100 MG Oral Cap 14 capsule 0     Sig: Take one capsule 2 times daily for 7 days                   Duration of  the service:  10 minutes

## 2021-01-06 NOTE — TELEPHONE ENCOUNTER
Pt called, Needs note for work as pt stayed home yesterday, pt states that she had an e-visit today. Pt does not want why or what her appt was about as it was a female issue.    Please call pt at [phone]

## 2021-01-06 NOTE — TELEPHONE ENCOUNTER
Spoke with the pt and advised of the recommendations from Dr. Lanie Castanon and she v/u  States that she sent a message to the  Provider and she did get the note she was requesting

## 2021-01-27 ENCOUNTER — PATIENT MESSAGE (OUTPATIENT)
Dept: FAMILY MEDICINE CLINIC | Facility: CLINIC | Age: 36
End: 2021-01-27

## 2021-01-27 DIAGNOSIS — R30.0 DYSURIA: Primary | ICD-10-CM

## 2021-01-27 RX ORDER — CIPROFLOXACIN 500 MG/1
500 TABLET, FILM COATED ORAL 2 TIMES DAILY
Qty: 10 TABLET | Refills: 0 | Status: SHIPPED | OUTPATIENT
Start: 2021-01-27 | End: 2021-02-01

## 2021-01-27 NOTE — TELEPHONE ENCOUNTER
Please call patient, see if she had urine culture done somewhere, if so please get results, I may just call in another round of abx

## 2021-01-27 NOTE — TELEPHONE ENCOUNTER
Pt responded by faye that she did not have a urine culture done when she was diagnosed with the last UTI- looks like it was an E Visit

## 2021-01-27 NOTE — TELEPHONE ENCOUNTER
From: Archie Noonan  To: Matthieu Mcfadden MD  Sent: 1/27/2021 11:03 AM CST  Subject: Visit Follow-up Question    I completed a round of antibiotics for a UTI two weeks ago today.  Since then I have still had pressure, cramps, slight nausea and urination f

## 2021-01-28 ENCOUNTER — TELEPHONE (OUTPATIENT)
Dept: FAMILY MEDICINE CLINIC | Facility: CLINIC | Age: 36
End: 2021-01-28

## 2021-01-28 ENCOUNTER — NURSE ONLY (OUTPATIENT)
Dept: FAMILY MEDICINE CLINIC | Facility: CLINIC | Age: 36
End: 2021-01-28
Payer: COMMERCIAL

## 2021-01-28 DIAGNOSIS — R30.0 DYSURIA: ICD-10-CM

## 2021-01-28 DIAGNOSIS — R31.29 MICROSCOPIC HEMATURIA: Primary | ICD-10-CM

## 2021-01-28 DIAGNOSIS — R10.9 FLANK PAIN: ICD-10-CM

## 2021-01-28 DIAGNOSIS — R39.198 DIFFICULTY VOIDING: ICD-10-CM

## 2021-01-28 LAB
BILIRUB UR QL STRIP.AUTO: NEGATIVE
GLUCOSE UR STRIP.AUTO-MCNC: NEGATIVE MG/DL
KETONES UR STRIP.AUTO-MCNC: 20 MG/DL
LEUKOCYTE ESTERASE UR QL STRIP.AUTO: NEGATIVE
NITRITE UR QL STRIP.AUTO: NEGATIVE
PH UR STRIP.AUTO: 6 [PH] (ref 4.5–8)
PROT UR STRIP.AUTO-MCNC: 30 MG/DL
RBC UR QL AUTO: NEGATIVE
SP GR UR STRIP.AUTO: 1.03 (ref 1–1.03)
UROBILINOGEN UR STRIP.AUTO-MCNC: <2 MG/DL

## 2021-01-28 PROCEDURE — 87086 URINE CULTURE/COLONY COUNT: CPT | Performed by: FAMILY MEDICINE

## 2021-01-28 PROCEDURE — 81001 URINALYSIS AUTO W/SCOPE: CPT | Performed by: FAMILY MEDICINE

## 2021-01-28 NOTE — TELEPHONE ENCOUNTER
Pt called, Pt would like a urnine test sent to [facility] note in [address], pt cannot have her phone on her at work.

## 2021-01-28 NOTE — TELEPHONE ENCOUNTER
Tesha message sent to pt asking what [facility] location she would to go to    Please see [address] notes- pt has UA and culture ordered in chart

## 2021-01-30 ENCOUNTER — HOSPITAL ENCOUNTER (OUTPATIENT)
Dept: CT IMAGING | Age: 36
Discharge: HOME | End: 2021-01-30
Attending: FAMILY MEDICINE
Payer: COMMERCIAL

## 2021-01-30 ENCOUNTER — PATIENT MESSAGE (OUTPATIENT)
Dept: FAMILY MEDICINE CLINIC | Facility: CLINIC | Age: 36
End: 2021-01-30

## 2021-01-30 DIAGNOSIS — R30.0 DYSURIA: ICD-10-CM

## 2021-01-30 DIAGNOSIS — R39.198 DIFFICULTY VOIDING: ICD-10-CM

## 2021-01-30 DIAGNOSIS — R31.29 MICROSCOPIC HEMATURIA: ICD-10-CM

## 2021-01-30 DIAGNOSIS — R10.9 FLANK PAIN: ICD-10-CM

## 2021-01-30 PROCEDURE — 74176 CT ABD & PELVIS W/O CONTRAST: CPT | Performed by: FAMILY MEDICINE

## 2021-05-21 RX ORDER — LORAZEPAM 0.5 MG/1
TABLET ORAL
Qty: 30 TABLET | Refills: 0 | Status: SHIPPED | OUTPATIENT
Start: 2021-05-21 | End: 2021-09-23

## 2021-05-21 RX ORDER — CYCLOBENZAPRINE HCL 10 MG
TABLET ORAL
Qty: 30 TABLET | Refills: 0 | Status: SHIPPED | OUTPATIENT
Start: 2021-05-21 | End: 2021-09-23

## 2021-06-25 ENCOUNTER — OFFICE VISIT (OUTPATIENT)
Dept: FAMILY MEDICINE CLINIC | Facility: CLINIC | Age: 36
End: 2021-06-25
Payer: COMMERCIAL

## 2021-06-25 VITALS
BODY MASS INDEX: 24.33 KG/M2 | DIASTOLIC BLOOD PRESSURE: 66 MMHG | TEMPERATURE: 99 F | RESPIRATION RATE: 18 BRPM | HEIGHT: 67 IN | OXYGEN SATURATION: 99 % | HEART RATE: 66 BPM | WEIGHT: 155 LBS | SYSTOLIC BLOOD PRESSURE: 118 MMHG

## 2021-06-25 DIAGNOSIS — Z83.49 FAMILY HISTORY OF THYROID DISEASE: ICD-10-CM

## 2021-06-25 DIAGNOSIS — Z12.39 SCREENING BREAST EXAMINATION: ICD-10-CM

## 2021-06-25 DIAGNOSIS — E55.9 VITAMIN D DEFICIENCY: ICD-10-CM

## 2021-06-25 DIAGNOSIS — Z12.4 CERVICAL CANCER SCREENING: ICD-10-CM

## 2021-06-25 DIAGNOSIS — Z13.220 LIPID SCREENING: ICD-10-CM

## 2021-06-25 DIAGNOSIS — R76.8 THYROID ANTIBODY POSITIVE: ICD-10-CM

## 2021-06-25 DIAGNOSIS — Z00.00 ROUTINE HISTORY AND PHYSICAL EXAMINATION OF ADULT: Primary | ICD-10-CM

## 2021-06-25 DIAGNOSIS — Z13.1 DIABETES MELLITUS SCREENING: ICD-10-CM

## 2021-06-25 DIAGNOSIS — F41.8 ANXIETY ASSOCIATED WITH DEPRESSION: ICD-10-CM

## 2021-06-25 PROCEDURE — 87624 HPV HI-RISK TYP POOLED RSLT: CPT | Performed by: FAMILY MEDICINE

## 2021-06-25 PROCEDURE — 99395 PREV VISIT EST AGE 18-39: CPT | Performed by: FAMILY MEDICINE

## 2021-06-25 PROCEDURE — 88175 CYTOPATH C/V AUTO FLUID REDO: CPT | Performed by: FAMILY MEDICINE

## 2021-06-25 PROCEDURE — 3008F BODY MASS INDEX DOCD: CPT | Performed by: FAMILY MEDICINE

## 2021-06-25 PROCEDURE — 3078F DIAST BP <80 MM HG: CPT | Performed by: FAMILY MEDICINE

## 2021-06-25 PROCEDURE — 3074F SYST BP LT 130 MM HG: CPT | Performed by: FAMILY MEDICINE

## 2021-06-25 NOTE — PROGRESS NOTES
HPI:   Priscilla Garcia is a 37year old female who presents for a complete physical exam.      Patient complains of nothing major, thinks health is good overall.       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 clear  NECK: supple,no adenopathy,no thyromegaly, no JVD  BREAST: no dominant or suspicious mass, no nipple discharge  LUNGS: clear to auscultation  CARDIO: RRR without murmur  GI: good BS's,no masses, HSM or tenderness  :introitus is normal, no path when she gets new insurance next month I can send script for brand see if covered  Thyroid antibody positive  -     CBC WITH DIFFERENTIAL WITH PLATELET; Future  -     COMP METABOLIC PANEL (14); Future  -     LIPID PANEL;  Future  -     TSH W REFLEX TO FREE Future  -     HPV HIGH RISK , THIN PREP COLLECTION; Future            The patient indicates understanding of these issues and agrees to the plan. The patient is asked to return for CPX in 1 year.     Call in 1 week for results if hasn't heard from us by th

## 2021-07-01 LAB
LAST PAP RESULT: NORMAL
PAP HISTORY (OTHER THAN LAST PAP): NORMAL

## 2021-07-06 ENCOUNTER — MED REC SCAN ONLY (OUTPATIENT)
Dept: FAMILY MEDICINE CLINIC | Facility: CLINIC | Age: 36
End: 2021-07-06

## 2021-07-09 RX ORDER — ETONOGESTREL AND ETHINYL ESTRADIOL .12; .015 MG/D; MG/D
RING VAGINAL
Qty: 1 RING | Refills: 3 | Status: SHIPPED | OUTPATIENT
Start: 2021-07-09 | End: 2021-11-17

## 2021-07-09 NOTE — TELEPHONE ENCOUNTER
Last refilled 8/25/20 for 3 rings with 3 RF  LOV with [facility] 6/25/21  No future appt  Last pap 6/25/21  Gynecology Medication Protocol [id number]/09/2021 04:28 PM   PASS-PENDING LAST PAP WNL--VIA MANUAL LOOKUP    Physical or Pelvic/Breast in past 12 or next 3 mo

## 2021-08-02 ENCOUNTER — PATIENT MESSAGE (OUTPATIENT)
Dept: FAMILY MEDICINE CLINIC | Facility: CLINIC | Age: 36
End: 2021-08-02

## 2021-08-02 ENCOUNTER — TELEPHONE (OUTPATIENT)
Dept: FAMILY MEDICINE CLINIC | Facility: CLINIC | Age: 36
End: 2021-08-02

## 2021-08-02 RX ORDER — FLUCONAZOLE 150 MG/1
150 TABLET ORAL ONCE
Qty: 2 TABLET | Refills: 0 | Status: SHIPPED | OUTPATIENT
Start: 2021-08-02 | End: 2021-08-02

## 2021-08-02 NOTE — TELEPHONE ENCOUNTER
I sent in script and replied to her in my chart, thx
LM on cell phone to call with symptoms and what was used OTC. Also sent my chart message due to patient not able to have phone at work.
Patient called checking on the status of her yeast medication. Advised that routed to Dr Radha Sherman and waiting for reply.   TY.
Please find out what her symtpoms are, how long she's had them, if this feels similar to anything she's had before and if any new sexual conacts (concern for STI exposure) and what she tried OTC
Pt needs medication for yeast infection, has been trying over the counter with no luck. Cannot have phone at work but can get on her mychart.  Please advise- thank you
done

## 2021-08-02 NOTE — TELEPHONE ENCOUNTER
From: Malgorzata Haro  Sent: 8/2/2021 11:55 AM CDT  To: [facility] Clinical Staff  Subject: RE:Yeast infection    Itching, Irritation, cramping. No new partners or chance of STI. It started with my period on Thursday last week.  I tried over the cou

## 2021-08-21 ENCOUNTER — MED REC SCAN ONLY (OUTPATIENT)
Dept: FAMILY MEDICINE CLINIC | Facility: CLINIC | Age: 36
End: 2021-08-21

## 2021-09-23 ENCOUNTER — TELEPHONE (OUTPATIENT)
Dept: FAMILY MEDICINE CLINIC | Facility: CLINIC | Age: 36
End: 2021-09-23

## 2021-09-23 RX ORDER — LORAZEPAM 0.5 MG/1
0.5 TABLET ORAL 2 TIMES DAILY PRN
Qty: 30 TABLET | Refills: 0 | Status: SHIPPED | OUTPATIENT
Start: 2021-09-23 | End: 2022-01-17

## 2021-09-23 RX ORDER — CYCLOBENZAPRINE HCL 10 MG
10 TABLET ORAL NIGHTLY PRN
Qty: 30 TABLET | Refills: 0 | Status: SHIPPED | OUTPATIENT
Start: 2021-09-23 | End: 2022-01-17

## 2021-09-23 NOTE — TELEPHONE ENCOUNTER
Rich Harada, Mydhili, MD [place], Mydhili Nurse 2 hours ago (1:37 PM)     Please have patient follow up / establish care with me by the end of the year.  Thank you ` MM (from refill encounter 09/23/21)

## 2021-09-23 NOTE — TELEPHONE ENCOUNTER
PLEASE CALL PATIENT- OK TO LM,   ALSO HAS QUESTIONS ON COVID VACCINE.    (please refer to Refill encounter 09/23/2021)

## 2021-09-23 NOTE — TELEPHONE ENCOUNTER
Left detailed message to voicemail (per verbal release form consent with confirmed identifying message) regarding refill request sent.  Patient advised to call office back to address her covid vaccine questions

## 2021-09-23 NOTE — TELEPHONE ENCOUNTER
NEED REFILLS ON -  LORAZEPAM 0.5 MG Oral Tab    CYCLOBENZAPRINE 10 MG Oral Tab    [id number] [address] [phone], [phone]    PLEASE CALL PATIENT- OK TO ,   ALSO HAS QUESTIONS ON COVID VACCINE.     PLEASE ADVISE- TH

## 2021-09-23 NOTE — TELEPHONE ENCOUNTER
LOV 06/25/2021 with Dr. Ayleen Bertrand    Last refill on 05/21/2021, for #30 tabs, with 0 refills  LORAZEPAM 0.5 MG Oral Tab    Last refill on 05/21/2021, for #30 tabs, with 0 refills  CYCLOBENZAPRINE 10 MG Oral Tab    No future appointments.       Order(s) pending,

## 2021-09-29 ENCOUNTER — OFFICE VISIT (OUTPATIENT)
Dept: FAMILY MEDICINE CLINIC | Facility: CLINIC | Age: 36
End: 2021-09-29
Payer: COMMERCIAL

## 2021-09-29 VITALS
BODY MASS INDEX: 24.12 KG/M2 | WEIGHT: 159.13 LBS | SYSTOLIC BLOOD PRESSURE: 108 MMHG | RESPIRATION RATE: 16 BRPM | DIASTOLIC BLOOD PRESSURE: 70 MMHG | OXYGEN SATURATION: 100 % | HEART RATE: 91 BPM | TEMPERATURE: 99 F | HEIGHT: 68 IN

## 2021-09-29 DIAGNOSIS — Z00.00 ANNUAL PHYSICAL EXAM: Primary | ICD-10-CM

## 2021-09-29 DIAGNOSIS — Z78.9 UNKNOWN STATUS OF IMMUNITY TO COVID-19 VIRUS: ICD-10-CM

## 2021-09-29 DIAGNOSIS — E55.9 VITAMIN D DEFICIENCY: ICD-10-CM

## 2021-09-29 DIAGNOSIS — Z71.85 VACCINE COUNSELING: ICD-10-CM

## 2021-09-29 DIAGNOSIS — R76.8 THYROID ANTIBODY POSITIVE: ICD-10-CM

## 2021-09-29 DIAGNOSIS — F43.22 ADJUSTMENT REACTION WITH ANXIETY: ICD-10-CM

## 2021-09-29 PROCEDURE — 80050 GENERAL HEALTH PANEL: CPT | Performed by: FAMILY MEDICINE

## 2021-09-29 PROCEDURE — 86800 THYROGLOBULIN ANTIBODY: CPT | Performed by: FAMILY MEDICINE

## 2021-09-29 PROCEDURE — 86376 MICROSOMAL ANTIBODY EACH: CPT | Performed by: FAMILY MEDICINE

## 2021-09-29 PROCEDURE — 3074F SYST BP LT 130 MM HG: CPT | Performed by: FAMILY MEDICINE

## 2021-09-29 PROCEDURE — 84480 ASSAY TRIIODOTHYRONINE (T3): CPT | Performed by: FAMILY MEDICINE

## 2021-09-29 PROCEDURE — 80061 LIPID PANEL: CPT | Performed by: FAMILY MEDICINE

## 2021-09-29 PROCEDURE — 3008F BODY MASS INDEX DOCD: CPT | Performed by: FAMILY MEDICINE

## 2021-09-29 PROCEDURE — 82306 VITAMIN D 25 HYDROXY: CPT | Performed by: FAMILY MEDICINE

## 2021-09-29 PROCEDURE — 99213 OFFICE O/P EST LOW 20 MIN: CPT | Performed by: FAMILY MEDICINE

## 2021-09-29 PROCEDURE — 99395 PREV VISIT EST AGE 18-39: CPT | Performed by: FAMILY MEDICINE

## 2021-09-29 PROCEDURE — 86769 SARS-COV-2 COVID-19 ANTIBODY: CPT | Performed by: FAMILY MEDICINE

## 2021-09-29 PROCEDURE — 3078F DIAST BP <80 MM HG: CPT | Performed by: FAMILY MEDICINE

## 2021-09-29 NOTE — PROGRESS NOTES
[facility] Family Medicine Office Note  Chief Complaint:   Patient presents with:  Establish Care: Would like to discuss getting covid vaccine. Needs refills on medications. Also would like covid antibody testing.        HPI:   This is a 31 year o INSOMNIA, NAUSEA ONLY  Codeine                 NAUSEA ONLY    Comment:Problems sleeping             Problems sleeping  Silicone                OTHER (SEE COMMENTS)    Comment:Yeast infection             Yeast infection  Sulfamethoxazole W/*    NAUSEA ONLY METABOLIC PANEL (14); Future  - LIPID PANEL; Future  - CBC WITH DIFFERENTIAL WITH PLATELET  - COMP METABOLIC PANEL (14)  - LIPID PANEL  - VENIPUNCTURE    2. Vitamin D deficiency  - VITAMIN D; Future  - VITAMIN D  - VENIPUNCTURE    3.  Thyroid antibody posit complications from the treatments as a result of today.      Problem List:  Patient Active Problem List:     Lumbar radiculopathy     HSV-1 infection     Thyroid antibody positive     Vitamin D deficiency     Family history of thyroid disease     Anxiety as

## 2021-11-17 RX ORDER — ETONOGESTREL AND ETHINYL ESTRADIOL 11.7; 2.7 MG/1; MG/1
1 INSERT, EXTENDED RELEASE VAGINAL
Qty: 1 RING | Refills: 3 | Status: SHIPPED | OUTPATIENT
Start: 2021-11-17

## 2021-11-17 NOTE — TELEPHONE ENCOUNTER
Gynecology Medication Protocol Passed 11/17/2021 02:05 PM    PASS-PENDING LAST PAP WNL--VIA MANUAL LOOKUP    Physical or Pelvic/Breast in past 12 or next 3 mos        ELURYNG 0.12-0.015 MG/24HR Vaginal Ring  Last refilled on 7/9/21 #1 ring with 3 rf.    LO

## 2022-01-05 NOTE — TELEPHONE ENCOUNTER
[facility] sent to pt regarding note below  Routing to nurse pool for follow-up message read by pt Thank you for your time!      Your next appointment on 2/16/2021 at 9am will be held  [x] In person at the clinic  [] Over the telephone  [] By video visit on Zoom.      If you've been prescribed medications or refills, the prescriptions have been sent to the pharmacy.      Please feel free to call the office at [phone] with any questions, comments, concerns or to schedule an earlier appointment.     If psychotherapy has been ordered, please call Central Scheduling at [phone] to schedule an appointment.      Emergency After Hours Service:  If you are experiencing a crisis and need help when the clinic is closed, please call [facility] at [phone].     Take care,  Kiana

## 2022-01-17 ENCOUNTER — E-VISIT (OUTPATIENT)
Dept: TELEHEALTH | Age: 37
End: 2022-01-17

## 2022-01-17 DIAGNOSIS — R39.9 UTI SYMPTOMS: Primary | ICD-10-CM

## 2022-01-17 PROCEDURE — 99422 OL DIG E/M SVC 11-20 MIN: CPT | Performed by: PHYSICIAN ASSISTANT

## 2022-01-17 RX ORDER — NITROFURANTOIN 25; 75 MG/1; MG/1
100 CAPSULE ORAL 2 TIMES DAILY
Qty: 14 CAPSULE | Refills: 0 | Status: SHIPPED | OUTPATIENT
Start: 2022-01-17 | End: 2022-01-24

## 2022-01-17 RX ORDER — CYCLOBENZAPRINE HCL 10 MG
10 TABLET ORAL NIGHTLY PRN
Qty: 30 TABLET | Refills: 0 | Status: SHIPPED | OUTPATIENT
Start: 2022-01-17

## 2022-01-17 RX ORDER — FLUCONAZOLE 150 MG/1
150 TABLET ORAL ONCE
Qty: 1 TABLET | Refills: 0 | Status: SHIPPED | OUTPATIENT
Start: 2022-01-17 | End: 2022-01-17

## 2022-01-17 RX ORDER — LORAZEPAM 0.5 MG/1
0.5 TABLET ORAL 2 TIMES DAILY PRN
Qty: 30 TABLET | Refills: 0 | Status: SHIPPED | OUTPATIENT
Start: 2022-01-17

## 2022-01-17 NOTE — TELEPHONE ENCOUNTER
With continued intermittent use of Lorazepam for anxiety and Cyclobenzaprine for muscle spasms I will need to see her every 6 months. Please have her schedule a visit to see me in March 2022.  Thank you ~ MIR

## 2022-01-17 NOTE — TELEPHONE ENCOUNTER
No refill protocol for this medication. Cyclobenzaprine:  Last refill: 9- #30 with 0 refills    Lorazepam:  Last refill: 9- #30 with 0 refills    Last Visit: 9-  Next Visit: No future appointments.       Forward to Dr. Sophia polanco

## 2022-01-18 NOTE — PROGRESS NOTES
HPI:  Judy Arias is a 31year old female who presents for an evisit. See [employer] above.     Current Outpatient Medications   Medication Sig Dispense Refill   • nitrofurantoin monohydrate macro 100 MG Oral Cap Take 1 capsule (100 • nitrofurantoin monohydrate macro 100 MG Oral Cap 14 capsule 0     Sig: Take 1 capsule (100 mg total) by mouth 2 (two) times daily for 7 days.    • fluconazole (DIFLUCAN) 150 MG Oral Tab 1 tablet 0     Sig: Take 1 tablet (150 mg total) by mouth once for toilet seat, or from sharing a bath. The most common cause of bladder infections is bacteria from the bowels. The bacteria get onto the skin around the opening of the urethra. From there, they can get into the urine.  Then they travel up to the bladder, ca bathroom. Wipe from front to back after using the toilet. This helps prevent the spread of bacteria. · Urinate more often. Don't try to hold urine in for a long time. · Wear loose-fitting clothes and cotton underwear. Don't wear tight-fitting pants.   · I indicates understanding of these issues and agrees to the plan. The patient is asked to return if sx's persist or worsen. Vinnie Brown understands evisit evaluation is not a substitute for face-to-face examination or emergency care.  Patient adv

## 2022-01-21 ENCOUNTER — TELEPHONE (OUTPATIENT)
Dept: FAMILY MEDICINE CLINIC | Facility: CLINIC | Age: 37
End: 2022-01-21

## 2022-01-21 ENCOUNTER — NURSE ONLY (OUTPATIENT)
Dept: FAMILY MEDICINE CLINIC | Facility: CLINIC | Age: 37
End: 2022-01-21
Payer: COMMERCIAL

## 2022-01-21 DIAGNOSIS — N39.0 URINARY TRACT INFECTION WITHOUT HEMATURIA, SITE UNSPECIFIED: Primary | ICD-10-CM

## 2022-01-21 LAB
APPEARANCE: CLEAR
BILIRUBIN: NEGATIVE
CONTROL LINE PRESENT WITH A CLEAR BACKGROUND (YES/NO): YES YES/NO
GLUCOSE (URINE DIPSTICK): NEGATIVE MG/DL
KETONES (URINE DIPSTICK): NEGATIVE MG/DL
LEUKOCYTES: NEGATIVE
MULTISTIX LOT#: NORMAL NUMERIC
NITRITE, URINE: NEGATIVE
OCCULT BLOOD: NEGATIVE
PH, URINE: 6.5 (ref 4.5–8)
PREGNANCY TEST, URINE: NEGATIVE
PROTEIN (URINE DIPSTICK): NEGATIVE MG/DL
SPECIFIC GRAVITY: 1.01 (ref 1–1.03)
URINE-COLOR: YELLOW
UROBILINOGEN,SEMI-QN: 0.2 MG/DL (ref 0–1.9)

## 2022-01-21 PROCEDURE — 81025 URINE PREGNANCY TEST: CPT | Performed by: FAMILY MEDICINE

## 2022-01-21 PROCEDURE — 81003 URINALYSIS AUTO W/O SCOPE: CPT | Performed by: FAMILY MEDICINE

## 2022-01-21 PROCEDURE — 87086 URINE CULTURE/COLONY COUNT: CPT | Performed by: FAMILY MEDICINE

## 2022-01-21 RX ORDER — CIPROFLOXACIN 500 MG/1
500 TABLET, FILM COATED ORAL 2 TIMES DAILY
Qty: 14 TABLET | Refills: 0 | Status: SHIPPED | OUTPATIENT
Start: 2022-01-21 | End: 2022-01-24

## 2022-01-21 RX ORDER — CEFDINIR 300 MG/1
300 CAPSULE ORAL 2 TIMES DAILY
Qty: 14 CAPSULE | Refills: 0 | Status: SHIPPED | OUTPATIENT
Start: 2022-01-21 | End: 2022-01-28

## 2022-01-21 NOTE — TELEPHONE ENCOUNTER
Pt called had e visit with another provider for a yeast infection was given antibiotics but they are not working pt still having discomfort and having pain in lower back       Pt call back # [phone]    Thank you

## 2022-01-21 NOTE — TELEPHONE ENCOUNTER
Received fax from [employer] [id number] regarding Rx request - Cipro - on backorder/unavailble    Discussed with Dr. Lorne Brown regarding note above - please order omnicef 300 mg BID, x7 days    New Rx sent - will notify pt at today's nurse visit

## 2022-01-21 NOTE — TELEPHONE ENCOUNTER
Pt reports she had E visit - Monday for UTI symptoms    Sx start Sunday am - urination w/ \"pungent smell\", a lot of pain  C/o Couldn't empty bladder, urgency, Frequency     Currently - Pain has improved, but still with discomfort  No more urgency, but st

## 2022-01-21 NOTE — TELEPHONE ENCOUNTER
Discussed with Dr. Alissa Bhatia regarding note below - please have pt provide urine sample - urine dip and culture  Pregnancy test - switch abx to cipro - stop macrobid    Schedule follow-up appt Monday

## 2022-01-21 NOTE — PROGRESS NOTES
Marta Kaur present in office for nurse visit. Pt v/u clean catch urine  Urine sample obtained     All questions/concerns addressed. Patient left in stable condition.     Letter provided to pt

## 2022-01-21 NOTE — TELEPHONE ENCOUNTER
Patient advised of Doctor's note below. Patient verbalized understanding. No further questions at this time.     Future Appointments   Date Time Provider Edenilson Judge   1/21/2022 11:00 AM SAL MILLER NURSE KATHERINE Johnson   1/24/2022  4:20 PM Joan

## 2022-01-24 ENCOUNTER — TELEPHONE (OUTPATIENT)
Dept: FAMILY MEDICINE CLINIC | Facility: CLINIC | Age: 37
End: 2022-01-24

## 2022-01-24 ENCOUNTER — OFFICE VISIT (OUTPATIENT)
Dept: FAMILY MEDICINE CLINIC | Facility: CLINIC | Age: 37
End: 2022-01-24
Payer: COMMERCIAL

## 2022-01-24 VITALS
WEIGHT: 159.63 LBS | DIASTOLIC BLOOD PRESSURE: 70 MMHG | OXYGEN SATURATION: 99 % | HEART RATE: 95 BPM | TEMPERATURE: 98 F | BODY MASS INDEX: 24 KG/M2 | SYSTOLIC BLOOD PRESSURE: 90 MMHG

## 2022-01-24 DIAGNOSIS — R63.0 DECREASE IN APPETITE: Primary | ICD-10-CM

## 2022-01-24 DIAGNOSIS — R39.15 URINARY URGENCY: ICD-10-CM

## 2022-01-24 DIAGNOSIS — R10.2 PELVIC PRESSURE IN FEMALE: ICD-10-CM

## 2022-01-24 DIAGNOSIS — R10.9 ACUTE RIGHT FLANK PAIN: ICD-10-CM

## 2022-01-24 LAB
ANION GAP SERPL CALC-SCNC: 5 MMOL/L (ref 0–18)
BUN BLD-MCNC: 9 MG/DL (ref 7–18)
CALCIUM BLD-MCNC: 9.4 MG/DL (ref 8.5–10.1)
CHLORIDE SERPL-SCNC: 107 MMOL/L (ref 98–112)
CO2 SERPL-SCNC: 28 MMOL/L (ref 21–32)
CREAT BLD-MCNC: 0.64 MG/DL
ERYTHROCYTE [DISTWIDTH] IN BLOOD BY AUTOMATED COUNT: 12.1 %
FASTING STATUS PATIENT QL REPORTED: NO
GLUCOSE BLD-MCNC: 95 MG/DL (ref 70–99)
HCT VFR BLD AUTO: 39.9 %
HGB BLD-MCNC: 13.2 G/DL
MCH RBC QN AUTO: 29.6 PG (ref 26–34)
MCHC RBC AUTO-ENTMCNC: 33.1 G/DL (ref 31–37)
MCV RBC AUTO: 89.5 FL
OSMOLALITY SERPL CALC.SUM OF ELEC: 288 MOSM/KG (ref 275–295)
PLATELET # BLD AUTO: 234 10(3)UL (ref 150–450)
POTASSIUM SERPL-SCNC: 4 MMOL/L (ref 3.5–5.1)
RBC # BLD AUTO: 4.46 X10(6)UL
SODIUM SERPL-SCNC: 140 MMOL/L (ref 136–145)
WBC # BLD AUTO: 6.3 X10(3) UL (ref 4–11)

## 2022-01-24 PROCEDURE — 3078F DIAST BP <80 MM HG: CPT | Performed by: FAMILY MEDICINE

## 2022-01-24 PROCEDURE — 80048 BASIC METABOLIC PNL TOTAL CA: CPT | Performed by: FAMILY MEDICINE

## 2022-01-24 PROCEDURE — 3074F SYST BP LT 130 MM HG: CPT | Performed by: FAMILY MEDICINE

## 2022-01-24 PROCEDURE — 99214 OFFICE O/P EST MOD 30 MIN: CPT | Performed by: FAMILY MEDICINE

## 2022-01-24 PROCEDURE — 85027 COMPLETE CBC AUTOMATED: CPT | Performed by: FAMILY MEDICINE

## 2022-01-24 NOTE — TELEPHONE ENCOUNTER
PT CALLED AND IS TRULY SORRY FOR ANY MISUNDERSTANDS, PT OSMANY NEEDS A NOTE TO RETURN BACK TO WORK TODAY. THE NOTE THAT WAS PROVIDED WAS TO RETURN TO WORK TOMORROW. PT ADV SHE WENT IN TODAY.     PT OSMANY OK TO JUST MODIFY AND CHANGE TO [place]     THANK YOU

## 2022-01-24 NOTE — TELEPHONE ENCOUNTER
Discussed with Dr. Gurinder Montaño regarding note below - okay to update note for work - please send to pt    Updated letter sent via [address] [place] [address] [address]

## 2022-01-24 NOTE — TELEPHONE ENCOUNTER
Pt requesting note for work - agreeable to receive via [employer] - letter pending, please review.  Thank you

## 2022-01-24 NOTE — PROGRESS NOTES
[facility] Family Medicine Office Note  Chief Complaint:   Patient presents with:   Follow - Up: UTI      HPI:   This is a 31year old female coming in for her follow up on UTI symptoms   Urine dip and Urine Cx obtained 4 days into this Macrobid ( breast-treated   • Other (Other) Maternal Grandmother         thyroid disorder   • Cancer Maternal Grandfather         skin   • Other (Other) Father         diverticulitis   • Psychiatric Paternal Grandmother         possibly bipolar   • Heart Disorder Beto Corrales (Temporal)   Wt 159 lb 9.6 oz (72.4 kg)   LMP 01/07/2022   SpO2 99%   BMI 24.27 kg/m²  Estimated body mass index is 24.27 kg/m² as calculated from the following:    Height as of 9/29/21: 5' 8\" (1.727 m).     Weight as of this encounter: 159 lb 9.6 oz (72.4 possibility of this being associated with pelvic discomfort/hormonal vaginal ring  - US PELVIS W EV (CPT=76856/20837); Future    4.  Pelvic pressure in female  As above, will rule out the possibility of this being pelvic congestion syndrome or other pelvic changing symptoms. Patient is to call with any side effects or complications from the treatments as a result of today.      Problem List:  Patient Active Problem List:     Lumbar radiculopathy     HSV-1 infection     Thyroid antibody positive     Vitamin D

## 2022-01-26 ENCOUNTER — TELEPHONE (OUTPATIENT)
Dept: FAMILY MEDICINE CLINIC | Facility: CLINIC | Age: 37
End: 2022-01-26

## 2022-01-26 ENCOUNTER — HOSPITAL ENCOUNTER (OUTPATIENT)
Dept: ULTRASOUND IMAGING | Age: 37
Discharge: HOME | End: 2022-01-26
Attending: FAMILY MEDICINE
Payer: COMMERCIAL

## 2022-01-26 DIAGNOSIS — R63.0 DECREASE IN APPETITE: ICD-10-CM

## 2022-01-26 DIAGNOSIS — R10.9 ACUTE RIGHT FLANK PAIN: ICD-10-CM

## 2022-01-26 PROCEDURE — 76770 US EXAM ABDO BACK WALL COMP: CPT | Performed by: FAMILY MEDICINE

## 2022-01-26 NOTE — TELEPHONE ENCOUNTER
----- Message from Salma Thayer MD sent at 1/25/2022 11:34 PM CST -----  Labs looked good. No concern for any kidney dysfunction and no concern for any infection at this time. Will await the US of pelvis / kidney / bladder. Thank you !  MM

## 2022-01-26 NOTE — TELEPHONE ENCOUNTER
[facility] sent to pt regarding doctor's note below  Routing to nurse pool for follow-up message read by pt  (Placed - Notify me if not read by: 1/27/22)

## 2022-02-07 ENCOUNTER — HOSPITAL ENCOUNTER (OUTPATIENT)
Dept: ULTRASOUND IMAGING | Age: 37
Discharge: HOME | End: 2022-02-07
Attending: FAMILY MEDICINE
Payer: COMMERCIAL

## 2022-02-07 DIAGNOSIS — R10.2 PELVIC PRESSURE IN FEMALE: ICD-10-CM

## 2022-02-07 DIAGNOSIS — R39.15 URINARY URGENCY: ICD-10-CM

## 2022-02-07 PROCEDURE — 76830 TRANSVAGINAL US NON-OB: CPT | Performed by: FAMILY MEDICINE

## 2022-02-07 PROCEDURE — 76856 US EXAM PELVIC COMPLETE: CPT | Performed by: FAMILY MEDICINE

## 2022-02-08 ENCOUNTER — PATIENT MESSAGE (OUTPATIENT)
Dept: FAMILY MEDICINE CLINIC | Facility: CLINIC | Age: 37
End: 2022-02-08

## 2022-02-08 NOTE — TELEPHONE ENCOUNTER
From: Evia Schwab  To: Salma Hatfield MD  Sent: 2/8/2022 12:54 PM CST  Subject: Question regarding Ultrasound Scan Pelvis    Good afternoon,    I'm reaching out to inquire whether the cyst on my right ovary could be causing the pressure and discomfort I feel. I have had them in the past, and I recall being in pain and discomfort, but it has been at least 20 years since it occurred. Thank you for your time.    Joshua Barcenas

## 2022-02-09 ENCOUNTER — PATIENT MESSAGE (OUTPATIENT)
Dept: FAMILY MEDICINE CLINIC | Facility: CLINIC | Age: 37
End: 2022-02-09

## 2022-02-09 NOTE — TELEPHONE ENCOUNTER
Celsa Maya, [address] [place] [address],[address] [facility] [phone]     [facility] sent to pt regarding info above

## 2022-02-10 ENCOUNTER — TELEPHONE (OUTPATIENT)
Dept: FAMILY MEDICINE CLINIC | Facility: CLINIC | Age: 37
End: 2022-02-10

## 2022-02-10 NOTE — TELEPHONE ENCOUNTER
Maylin Bang Senior Jr's office  PH#[phone]    - to clarify if pt needs referral from PCP office    This RN was advised pt will need referral sent to their office to review and see if they can take pt    Fax #[phone]

## 2022-02-10 NOTE — TELEPHONE ENCOUNTER
Patient is requesting a new referral to see another obgyn doctor patient prefers a female doctor (Dr. Phillip Cardenas) Patient would like a call back tomorrow morning she will unavailable to answer do to being at work.

## 2022-02-12 ENCOUNTER — TELEPHONE (OUTPATIENT)
Dept: FAMILY MEDICINE CLINIC | Facility: CLINIC | Age: 37
End: 2022-02-12

## 2022-02-12 NOTE — TELEPHONE ENCOUNTER
Med Rec Request from [facility], via fax on 2/11/22. Made copy of request and bar code to this note and sent to scanning today, original request sent to [employer] today also.

## 2022-02-25 ENCOUNTER — PATIENT MESSAGE (OUTPATIENT)
Dept: FAMILY MEDICINE CLINIC | Facility: CLINIC | Age: 37
End: 2022-02-25

## 2022-02-25 NOTE — TELEPHONE ENCOUNTER
From: Marcus Granados  To: Salma Pan MD  Sent: 2/25/2022 7:16 AM CST  Subject: Referral    Good afternoon,   I was referred to a gynecologist by Dr. Melly Sargent, and so I scheduled with Dr. Angela Aviles in [facility]. I filled out a release of records form for them on February 11th, 2022, if you need verification of the upcoming appointment with her. The appointment I have scheduled is on March 21st, 2022, and I will be missing some work to attend the appointment. Would it be possible to have a note verifying that I was referred to a specialist to present to my employer? I know it is an odd request, but as I am not an established patient prior to attending this appointment, they do not provide a note. I am requesting this as I am unable to get the time off approved ahead of time without verification and so I find myself in a tricky situation. If you can help, I greatly appreciate it. If you are unable to, I also understand since this is an odd situation. Please just let me know if you are unable to assist.      I am available to take a call with any questions after 4pm any day. Or a message via this application. Thank you for your assistance!      Yelena Beltrán

## 2022-02-27 ENCOUNTER — DOCUMENTATION ONLY (OUTPATIENT)
Dept: FAMILY MEDICINE CLINIC | Facility: CLINIC | Age: 37
End: 2022-02-27

## 2022-03-12 RX ORDER — CYCLOBENZAPRINE HCL 10 MG
TABLET ORAL
Qty: 30 TABLET | Refills: 0 | Status: SHIPPED | OUTPATIENT
Start: 2022-03-12

## 2022-03-12 NOTE — TELEPHONE ENCOUNTER
LOV 01/21/2022    Last refill on 1/17/2022, for #30 tabs, with 0 refills  cyclobenzaprine 10 MG Oral Tab    No future appointments. Order(s) pending, please review. Thank you.

## 2022-04-05 RX ORDER — ETONOGESTREL AND ETHINYL ESTRADIOL .12; .015 MG/D; MG/D
RING VAGINAL
Qty: 3 EACH | Refills: 0 | Status: SHIPPED | OUTPATIENT
Start: 2022-04-05

## 2022-04-05 NOTE — TELEPHONE ENCOUNTER
Last OV 1/24/22  Last pap 6/25/21 per result \"repeat PAP smear in 3-5 years\"  Refilled     Gynecology Medication Protocol Passed 04/05/2022 12:30 AM    PASS-PENDING LAST PAP WNL--VIA MANUAL LOOKUP    Physical or Pelvic/Breast in past 12 or next 3 mos--VIA MANUAL LOOKUP     Refilled x 3 months per protocol

## 2022-05-06 ENCOUNTER — TELEPHONE (OUTPATIENT)
Dept: FAMILY MEDICINE CLINIC | Facility: CLINIC | Age: 37
End: 2022-05-06

## 2022-05-06 NOTE — TELEPHONE ENCOUNTER
Received fax from [address] [place] [address] regarding pt's medication that will not be covered starting 07/01/2022    [facility] 0.12-0.015 MG/24HR Vaginal Ring      Please advise, thank you

## 2022-05-06 NOTE — TELEPHONE ENCOUNTER
Please discuss this the patient, and pharmacy to find out is options such as the generic Nuvaring may be covered. Another option is to use [facility], however if this is also cost prohibitive we may have to consider switch to another form of birth control. Please discuss how the patient would like to handle this.

## 2022-06-23 RX ORDER — ETONOGESTREL AND ETHINYL ESTRADIOL .12; .015 MG/D; MG/D
RING VAGINAL
Refills: 0 | OUTPATIENT
Start: 2022-06-23

## 2022-06-23 NOTE — TELEPHONE ENCOUNTER
Medication Quantity Refills Start End   ELURYNG 0.12-0.015 MG/24HR Vaginal Ring 3 each 0 4/5/2022      Refill request too soon    Refill refused

## 2022-06-24 RX ORDER — ETONOGESTREL AND ETHINYL ESTRADIOL 11.7; 2.7 MG/1; MG/1
1 INSERT, EXTENDED RELEASE VAGINAL
Qty: 3 EACH | Refills: 0 | Status: SHIPPED | OUTPATIENT
Start: 2022-06-24

## 2022-06-24 RX ORDER — CYCLOBENZAPRINE HCL 10 MG
10 TABLET ORAL NIGHTLY PRN
Qty: 30 TABLET | Refills: 0 | Status: SHIPPED | OUTPATIENT
Start: 2022-06-24

## 2022-06-24 RX ORDER — LORAZEPAM 0.5 MG/1
0.5 TABLET ORAL 2 TIMES DAILY PRN
Qty: 30 TABLET | Refills: 0 | Status: SHIPPED | OUTPATIENT
Start: 2022-06-24

## 2022-06-24 NOTE — TELEPHONE ENCOUNTER
Routing to provider per protocol. CYCLOBENZAPRINE 10 MG Oral Tab  Last refilled on 3/12/22 for #30  with 0 rf. LORazepam 0.5 MG Oral Tab  Last refilled on 1/17/22 for #30  with 0 rf. Last labs 1/24/22. Last seen on 1/24/22. No future appointments. Thank you.

## 2022-06-24 NOTE — TELEPHONE ENCOUNTER
Gynecology Medication Protocol Passed 06/24/2022 06:56 AM    PASS-PENDING LAST PAP WNL--VIA MANUAL LOOKUP    Physical or Pelvic/Breast in past 12 or next 3 mos     Refilled per protocol  ELURYNG 0.12-0.015 MG/24HR Vaginal Ring  Last refilled on 4/5/22 #3 with 0 rf.   LOV- 1/24/22  Last labs- 1/24/22    Sent to pharmacy

## 2022-09-19 RX ORDER — ETONOGESTREL AND ETHINYL ESTRADIOL .12; .015 MG/D; MG/D
RING VAGINAL
Qty: 3 EACH | Refills: 1 | Status: SHIPPED | OUTPATIENT
Start: 2022-09-19

## 2023-03-06 RX ORDER — CYCLOBENZAPRINE HCL 10 MG
TABLET ORAL
Qty: 30 TABLET | Refills: 0 | Status: SHIPPED | OUTPATIENT
Start: 2023-03-06

## 2023-04-24 ENCOUNTER — PATIENT OUTREACH (OUTPATIENT)
Dept: CASE MANAGEMENT | Age: 38
End: 2023-04-24

## 2023-04-24 RX ORDER — ETONOGESTREL/ETHINYL ESTRADIOL .12-.015MG
RING, VAGINAL VAGINAL
Refills: 1 | OUTPATIENT
Start: 2023-04-24

## 2023-04-24 NOTE — PROCEDURES
The office order for PCP removal request is Approved and finalized on April 24, 2023.     Thanks,  [facility] [employer]

## 2023-04-24 NOTE — TELEPHONE ENCOUNTER
LOV 01/24/22  Last labs 01/21/22  Last refill on 09/19/22, for #3 each, with 1 refills  ELURYNG 0.12-0.015 MG/24HR Vaginal Ring  Gynecology Medication Protocol Failed 04/23/2023 01:23 PM    Physical or Pelvic/Breast in past 12 or next 3 mos--VIA MANUAL LOOKUP    PASS-PENDING LAST PAP WNL--VIA MANUAL LOOKUP       No future appointments. Order(s) pending, please review. Thank you.

## (undated) DIAGNOSIS — N83.201 RIGHT OVARIAN CYST: ICD-10-CM

## (undated) DIAGNOSIS — R10.2 PELVIC PRESSURE IN FEMALE: Primary | ICD-10-CM

## (undated) NOTE — MR AVS SNAPSHOT
After Visit Summary   6/25/2021    Natalia Patel    MRN: [id number]           Visit Information     Date & Time  6/25/2021  2:30 PM Provider  Deisy Crooks MD [address]  Phone  [phone] RISK , THIN PREP COLLECTION [[id number] CUSTOM]     THINPREP PAP SMEAR B [[id number] CUSTOM]     THINPREP PAP SMEAR ONLY [[id number] CUSTOM]     Future Labs/Procedures Expected by Expires    CBC WITH DIFFERENTIAL WITH PLATELET [[id number] CUSTOM]  6/25/2021 (Approxima injury that does not require immediate attention VIDEO VISITS  Average cost  $35*    e-VISTS  Average cost  $35*     SAME DAY APPOINTMENTS   Available at primary care offices    [address] [place] [address]  OFFICE VISIT   Primary Care Providers  Treatment fo

## (undated) NOTE — LETTER
Date: 1/24/2022    Patient Name: Adam Crooks          To Whom it may concern:     This letter has been written at the patient's request. The above patient was seen today, 1/24/2022, at the [facility] for treatment of a medical conditi

## (undated) NOTE — MR AVS SNAPSHOT
[address] [facility] [address] [phone]               Thank you for choosing us for your health care visit with Kaylene Bennett MD.  We are glad to serve you and happy to provide you with this sum Commonly known as:  ATIVAN           MULTIVITAMIN & MINERAL OR   Take  by mouth. VITALINE BIOTIN FORTE 0.8 MG Tabs   Take  by mouth.                    Results of Recent Testing     POCT PREGNANCY, URINE      Component    POCT urine pregnancy    N

## (undated) NOTE — LETTER
[address] [address]  [phone]          3/25/2020    Dear Employer,  At [facility], we are taking special precautions and doing everything we ca

## (undated) NOTE — LETTER
Date: 1/21/2022    Patient Name: Josias Arredondo      To Whom it may concern: This letter has been written at the patient's request.     This patient should be excused from attending work/school on 1/18/2022 and from 1/20/2022 through 1/21/2022.

## (undated) NOTE — LETTER
Date: 1/24/2022    Patient Name: Cliff Cooney          To Whom it may concern:     This letter has been written at the patient's request. The above patient was seen today, 1/24/2022, at the [facility] for treatment of a medical conditi

## (undated) NOTE — MR AVS SNAPSHOT
[address] [facility] [address] [phone]               Thank you for choosing us for your health care visit with Shivani Alonzo MD.  We are glad to serve you and happy to provide you with this sum MULTIVITAMIN & MINERAL OR   Take  by mouth. VITALINE BIOTIN FORTE 0.8 MG Tabs   Take  by mouth. Vitamin D 2000 units Caps   Take 2,000-4,000 Units by mouth daily. * Notice:   This list has 2 medication(s) that are the same as

## (undated) NOTE — Clinical Note
Date: 1/16/2017    Patient Name: Abel Fleming          To Whom it may concern: This letter has been written at the patient's request. The above patient was seen at the [facility] for treatment of a medical condition.     This patient

## (undated) NOTE — MR AVS SNAPSHOT
[address] [facility] [address]  [phone]               Thank you for choosing us for your health care visit with Cathleen Dubon MD.  We are glad to serve you and happy to provide you with this sum VITALINE BIOTIN FORTE 0.8 MG Tabs   Take  by mouth. Vitamin D 2000 units Caps   Take 2,000-4,000 Units by mouth daily.                 Where to Get Your Medications      These medications were sent to [facility] [address], [facility]

## (undated) NOTE — MR AVS SNAPSHOT
[address] [facility] [address]  [phone]               Thank you for choosing us for your health care visit with Brandon Gustafson DO.   We are glad to serve you and happy to provide you with this sum Commonly known as:  MEDROL           MULTIVITAMIN & MINERAL OR   Take  by mouth. VITALINE BIOTIN FORTE 0.8 MG Tabs   Take  by mouth. Vitamin D 2000 units Caps   Take 2,000-4,000 Units by mouth daily.                 Where to Get Your Med Tips for increasing your physical activity – Adults who are physically active are less likely to develop some chronic diseases than adults who are inactive.      HOW TO GET STARTED: HOW TO STAY MOTIVATED:   Start activities slowly and build up over time Do

## (undated) NOTE — LETTER
Date: 1/6/2021    Patient Name: Edmund Banda          To Whom it may concern:     This letter has been written at the patient's request. The above patient conducted a virtual visit with an [employer] provider for treatment of a medical conditio

## (undated) NOTE — MR AVS SNAPSHOT
After Visit Summary   4/6/2017    Abdelrahman Denney    MRN: [id number]           Visit Information        Provider Department Dept Phone    4/6/2017  8:40 AM Manuel Schneider MD [facility] [phone]      Your Vitals Were     BP Pulse Temp(Src) THINPREP PAP WITH HPV REFLEX REQUEST [WAE1021 CUSTOM]  4/6/2017 4/6/2018                [facility] now offers Video Visits through [address] for adult and pediatric patients.   Video Visits are available Monday - Friday for many common conditions such as allergies,